# Patient Record
Sex: FEMALE | Race: BLACK OR AFRICAN AMERICAN | NOT HISPANIC OR LATINO | Employment: PART TIME | URBAN - METROPOLITAN AREA
[De-identification: names, ages, dates, MRNs, and addresses within clinical notes are randomized per-mention and may not be internally consistent; named-entity substitution may affect disease eponyms.]

---

## 2020-06-02 ENCOUNTER — OFFICE VISIT (OUTPATIENT)
Dept: FAMILY MEDICINE | Facility: CLINIC | Age: 30
End: 2020-06-02
Payer: COMMERCIAL

## 2020-06-02 VITALS
HEART RATE: 78 BPM | DIASTOLIC BLOOD PRESSURE: 87 MMHG | SYSTOLIC BLOOD PRESSURE: 127 MMHG | WEIGHT: 230.8 LBS | TEMPERATURE: 98 F | BODY MASS INDEX: 36.22 KG/M2 | OXYGEN SATURATION: 96 % | HEIGHT: 67 IN | RESPIRATION RATE: 18 BRPM

## 2020-06-02 DIAGNOSIS — Z11.3 SCREEN FOR STD (SEXUALLY TRANSMITTED DISEASE): Primary | ICD-10-CM

## 2020-06-02 DIAGNOSIS — R30.0 DYSURIA: ICD-10-CM

## 2020-06-02 LAB
BACTERIA: NORMAL
BILIRUBIN UR: NEGATIVE MG/DL
BLOOD UR: ABNORMAL MG/DL
CASTS: NORMAL /LPF
CLARITY, URINE: CLEAR
COLOR UR: YELLOW
CRYSTAL URINE: NORMAL /LPF
EPITHELIAL CELLS UR: NORMAL /LPF (ref 0–2)
GLUCOSE URINE: NEGATIVE
HIV 1+2 AB+HIV1 P24 AG SERPL QL IA: NEGATIVE
KETONES UR QL: NEGATIVE MG/DL
LEUKOCYTE ESTERASE UR: ABNORMAL
MUCOUS URINE: NORMAL LPF
NITRITE UR QL STRIP: NEGATIVE MG/DL
PH UR STRIP: 6.5 [PH] (ref 4.5–8)
PROTEIN UR: ABNORMAL MG/DL
RBC URINE: <2 /HPF
SP GR UR STRIP: 1.02 (ref 1–1.03)
UROBILINOGEN UR STRIP-ACNC: ABNORMAL E.U./DL
WBC URINE: NORMAL /HPF

## 2020-06-02 RX ORDER — CEPHALEXIN 500 MG/1
500 CAPSULE ORAL 2 TIMES DAILY
Qty: 10 CAPSULE | Refills: 0 | Status: SHIPPED | OUTPATIENT
Start: 2020-06-02 | End: 2020-06-07

## 2020-06-02 RX ORDER — IBUPROFEN 200 MG
400 TABLET ORAL
COMMUNITY
End: 2022-08-24

## 2020-06-02 ASSESSMENT — MIFFLIN-ST. JEOR: SCORE: 1802.14

## 2020-06-02 NOTE — LETTER
July 15, 2020      Ochoa Reji Fran  [address]   [place] [id number]        Dear ,    We are writing to inform you of your test results.    I have personally reviewed the results of your urinalysis, chlamydia/gonorrhea, syphilis, and HIV testing. They are within normal limits. No further testing is needed at this time.     Resulted Orders   Syphilis Screen [place] ([facility])   Result Value Ref Range    Treponema Antibody (Syphilis) Negative Negative    Narrative    Test performed by:  [facility] [address] [place] [address]   HIV Ag/Ab Screen [place] ([facility])   Result Value Ref Range    HIV Antigen/Antibody Negative Negative    Narrative    Test performed by:  [facility] [address] [place] [address]  Method is Abbott HIV Ag/Ab for the detection of HIV p24 antigen, HIV-1   antibodies and HIV-2 antibodies.   Chlamydia/Gono Amplified ([facility])   Result Value Ref Range    Chlamydia trac,Amplified Prb Negative Negative    N gonorrhoeae,Amplified Prb Negative Negative    Narrative    Test performed by:  [facility] [address] [place] [address]   Urinalysis, Micro If ([facility])   Result Value Ref Range    Specific Gravity Urine 1.020 1.005 - 1.030    pH Urine 6.5 4.5 - 8.0    Leukocyte Esterase UR 2+ (A) NEGATIVE    Nitrite Urine Negative NEGATIVE mg/dL    Protein UR Trace (A) NEGATIVE mg/dL    Glucose Urine Negative NEGATIVE    Ketones Urine Negative NEGATIVE mg/dL    Urobilinogen mg/dL 0.2 E/U./dL 0.2 E.U./dL E.U./dL    Bilirubin UR Negative NEGATIVE mg/dL    Blood UR 1+ (A) NEGATIVE mg/dL    Color Urine Yellow     Clarity, urine Clear    Urine Microscopic ([facility])   Result Value Ref Range    WBC Urine 25-50 <5 /hpf    RBC Urine <2 <5 /hpf    Epithelial Cells UR 25-50 0 - 2 /lpf    Mucous Urine None NONE lpf    Casts Urine WBC Casts NONE /lpf    Crystal Urine None NONE /lpf    Bacteria Wet Prep Many None       If you have any  questions or concerns, please call the clinic at the number listed above.       Sincerely,        Lisette Sanford MD

## 2020-06-02 NOTE — PROGRESS NOTES
Preceptor Attestation:   Patient seen, evaluated and discussed with the resident. I have verified the content of the note, which accurately reflects my assessment of the patient and the plan of care.  Supervising Physician:Lizeth Amador MD  [facility]

## 2020-06-02 NOTE — PROGRESS NOTES
HPI:       Ochoa Peters is a 29 year old  female with no significant PMH who presents for STI check and burning with urination.    Patient notes that she has had burning with urination and urinary frequency/urge for 2-3 days. Denies suprapubic pain, hematuria, vaginal discharge, vaginal itching, or dyspareunia. Bowel movements have been normal. Patient is prone to UTIs and this feels much like that. Last unprotected sexual activity was this past weekend, so approximately 3-4 days ago. Patient has the same partner.    No  was used for this visit.          PMHX:     Past medical, surgical, social, family history reviewed with patient and updated appropriately. Medications and allergies reviewed and updated as well.    There is no problem list on file for this patient.      Current Outpatient Medications   Medication Sig Dispense Refill     cephALEXin (KEFLEX) 500 MG capsule Take 1 capsule (500 mg) by mouth 2 times daily for 5 days 10 capsule 0       Social History     Socioeconomic History     Marital status: Single     Spouse name: Not on file     Number of children: Not on file     Years of education: Not on file     Highest education level: Not on file   Occupational History     Not on file   Social Needs     Financial resource strain: Not on file     Food insecurity     Worry: Not on file     Inability: Not on file     Transportation needs     Medical: Not on file     Non-medical: Not on file   Tobacco Use     Smoking status: Never Smoker     Smokeless tobacco: Never Used   Substance and Sexual Activity     Alcohol use: Yes     Comment: Rare     Drug use: Never     Sexual activity: Yes     Partners: Male   Lifestyle     Physical activity     Days per week: Not on file     Minutes per session: Not on file     Stress: Not on file   Relationships     Social connections     Talks on phone: Not on file     Gets together: Not on file     Attends Episcopalian service: Not on file     Active  "member of club or organization: Not on file     Attends meetings of clubs or organizations: Not on file     Relationship status: Not on file     Intimate partner violence     Fear of current or ex partner: Not on file     Emotionally abused: Not on file     Physically abused: Not on file     Forced sexual activity: Not on file   Other Topics Concern     Not on file   Social History Narrative     Not on file        No Known Allergies    No results found for this or any previous visit (from the past 24 hour(s)).         Review of Systems:   12 point review of systems negative unless stated in HPI.          Physical Exam:     Vitals:    06/02/20 1135   BP: 127/87   Pulse: 78   Resp: 18   Temp: 98  F (36.7  C)   TempSrc: Oral   SpO2: 96%   Weight: 104.7 kg (230 lb 12.8 oz)   Height: 1.69 m (5' 6.54\")     Body mass index is 36.66 kg/m .    GENERAL APPEARANCE: healthy, alert and no distress.  EYES: Eyes grossly normal to inspection,  PERRL  RESP: lungs clear to auscultation - no rales, rhonchi or wheezes  CV: regular rate and rhythm,  and no murmur, click,  rub or gallop  ABDOMEN: soft, nontender, without hepatosplenomegaly or masses, no suprapubic or CVA tenderness  MS: extremities normal- no gross deformities noted  SKIN: no suspicious lesions or rashes  NEURO: Normal strength and tone, sensory exam grossly normal, mentation appears intact and speech normal  PSYCH: mood and affect normal/bright    Assessment and Plan     Dariana Peters is a 29 year-old female with no significant PMH presenting to [facility] with the following concerns: STI check and dysuria.    Dysuria, STI check: Patient has had 2-3 days of dysuria and urinary frequency. No additional symptoms and no vaginal symptoms or discharge. Patient states that she has history of multiple UTIs and this feels much like her previous UTIs. She is sexually active and does have unprotected sex, but it is with a stable partner and her concern for STIs is " low. However, she is open to having STI testing just in case.  -Urinalysis + UC  -GC/chlamydia  -HIV  -Syphilis  -Will prescribe keflex 500 mg BID for 5 days for suspected UTI and narrow based on culture results    Options for treatment and follow-up care were reviewed with the patient and/or guardian. Ochoa Peters and/or guardian engaged in the decision making process and verbalized understanding of the options discussed and agreed with the final plan.    Lisette Sanford MD      Precepted today with: Lizeth Amador MD

## 2020-06-03 LAB — TREPONEMA ANTIBODY (SYPHILIS): NEGATIVE

## 2020-06-05 LAB
C TRACH RRNA SPEC QL NAA+PROBE: NEGATIVE
N GONORRHOEA RRNA SPEC QL NAA+PROBE: NEGATIVE

## 2020-06-10 ENCOUNTER — TELEPHONE (OUTPATIENT)
Dept: FAMILY MEDICINE | Facility: CLINIC | Age: 30
End: 2020-06-10

## 2020-06-10 NOTE — TELEPHONE ENCOUNTER
[facility] phone call message- general phone call:    Reason for call: Patient called stating she was seen 6/2/2020 and is wanting to know if her results are back yet as she has not heard back. Please call and advise.    Return call needed: Yes    OK to leave a message on voice mail? Yes    Primary language: English      needed? No    Call taken on Jenny 10, 2020 at 11:26 AM by Shayna Cruz

## 2020-06-10 NOTE — TELEPHONE ENCOUNTER
STI results were negative. Ochoa still has not picked up Keflex that was prescribed on 6/7/2020. Still experiencing symptoms, will plan to  med today to start. Michelle HERNANDEZ

## 2020-07-15 NOTE — RESULT ENCOUNTER NOTE
Please mail the results to the patient:     Dear Ms Peters,    I have personally reviewed the results of your urinalysis, chlamydia/gonorrhea, syphilis, and HIV testing. They are within normal limits. No further testing is needed at this time.    Sincerely,    Lisette Sanford MD

## 2020-08-28 ENCOUNTER — OFFICE VISIT (OUTPATIENT)
Dept: FAMILY MEDICINE | Facility: CLINIC | Age: 30
End: 2020-08-28
Payer: COMMERCIAL

## 2020-08-28 VITALS
WEIGHT: 236 LBS | OXYGEN SATURATION: 99 % | HEIGHT: 65 IN | HEART RATE: 78 BPM | BODY MASS INDEX: 39.32 KG/M2 | DIASTOLIC BLOOD PRESSURE: 82 MMHG | SYSTOLIC BLOOD PRESSURE: 129 MMHG | TEMPERATURE: 97.9 F | RESPIRATION RATE: 16 BRPM

## 2020-08-28 DIAGNOSIS — B96.89 BACTERIAL VAGINOSIS: Primary | ICD-10-CM

## 2020-08-28 DIAGNOSIS — N76.0 BACTERIAL VAGINOSIS: Primary | ICD-10-CM

## 2020-08-28 DIAGNOSIS — Z11.3 SCREEN FOR STD (SEXUALLY TRANSMITTED DISEASE): Primary | ICD-10-CM

## 2020-08-28 DIAGNOSIS — R30.0 DYSURIA: ICD-10-CM

## 2020-08-28 LAB
BACTERIA: NORMAL
CLUE CELLS: NORMAL
MOTILE TRICHOMONAS: NEGATIVE
ODOR: NORMAL
PH WET PREP: >4.5 (ref 3.8–4.5)
WBC WET PREP: NORMAL (ref 2–5)
YEAST: NORMAL

## 2020-08-28 RX ORDER — METRONIDAZOLE 500 MG/1
500 TABLET ORAL 2 TIMES DAILY
Qty: 14 TABLET | Refills: 0 | Status: SHIPPED | OUTPATIENT
Start: 2020-08-28 | End: 2020-09-04

## 2020-08-28 ASSESSMENT — MIFFLIN-ST. JEOR: SCORE: 1806.98

## 2020-08-28 NOTE — LETTER
September 2, 2020      Ochoa Peters  [facility]   [place] [address]        Dear Ms.Fran,    We are writing to inform you of your test results.    I have personally reviewed the results of your chlamydia and gonorrhea. As I suspected, they are both negative. As we discussed earlier, your symptoms are likely related to bacterial vaginosis for which I have already ordered a prescription for flagyl for 7 days.  No further testing is needed at this time.    Resulted Orders   Chlamydia/Gono Amplified ([facility])   Result Value Ref Range    Chlamydia trac,Amplified Prb Negative Negative    N gonorrhoeae,Amplified Prb Negative Negative    Narrative    Test performed by:  [facility] [address] [place] [address]   Wet Prep ([facility])   Result Value Ref Range    Yeast Wet Prep None none    Motile Trichomonas Wet Prep Negative Negative    Clue Cells Wet Prep Present >20% NONE    WBC WET PREP 2-5 2 - 5    Bacteria Wet Prep Many None    pH Wet Prep >4.5 3.8 - 4.5    Odor Wet Prep None NONE       If you have any questions or concerns, please call the clinic at the number listed above.       Sincerely,        Lisette Sanford MD

## 2020-08-28 NOTE — PROGRESS NOTES
HPI:       Ochoa Peters is a 29 year old  female with PMH of recurrent BV infections who presents for suspected recurrent BV infection.    Patient states that she has had issues with recurrent BV infections. States that she is having white vaginal discharge with a fishy odor that is exactly like her other BV infections. Denies abnormal bleeding, itching, dysuria, hematuria, urinary frequency. Denies fevers or chills. Would like testing for BV today and is okay with chlamydia gonorrhea screening as well.            PMHX:     There is no problem list on file for this patient.      Current Outpatient Medications   Medication Sig Dispense Refill     ibuprofen (ADVIL/MOTRIN) 200 MG tablet Take 400 mg by mouth       omeprazole (PRILOSEC) 20 MG DR capsule Take 20 mg by mouth         Social History     Socioeconomic History     Marital status: Single     Spouse name: Not on file     Number of children: Not on file     Years of education: Not on file     Highest education level: Not on file   Occupational History     Not on file   Social Needs     Financial resource strain: Not on file     Food insecurity     Worry: Not on file     Inability: Not on file     Transportation needs     Medical: Not on file     Non-medical: Not on file   Tobacco Use     Smoking status: Never Smoker     Smokeless tobacco: Never Used   Substance and Sexual Activity     Alcohol use: Yes     Comment: Rare     Drug use: Never     Sexual activity: Yes     Partners: Male   Lifestyle     Physical activity     Days per week: Not on file     Minutes per session: Not on file     Stress: Not on file   Relationships     Social connections     Talks on phone: Not on file     Gets together: Not on file     Attends Orthodox service: Not on file     Active member of club or organization: Not on file     Attends meetings of clubs or organizations: Not on file     Relationship status: Not on file     Intimate partner violence     Fear of current  or ex partner: Not on file     Emotionally abused: Not on file     Physically abused: Not on file     Forced sexual activity: Not on file   Other Topics Concern     Not on file   Social History Narrative     Not on file        No Known Allergies    No results found for this or any previous visit (from the past 24 hour(s)).         Review of Systems:   12 point review of systems negative unless stated in HPI.           Physical Exam:     There were no vitals filed for this visit.  There is no height or weight on file to calculate BMI.    GENERAL APPEARANCE: healthy, alert and no distress.  RESP: breathing unlabored on room air  : External vagina appears normal. No cervical tenderness. Copious white discharge with faint odor present over cervix and vaginal vault. No bleeding visible.   SKIN: no suspicious lesions or rashes  NEURO: Normal strength and tone, sensory exam grossly normal, mentation appears intact and speech normal  PSYCH: mood and affect normal/bright      Assessment and Plan     Dariana Peters is a 29 year-old female with PMH of recurrent BV infections who presents with the following concern: vaginal discharge.    Vaginal discharge: Patient has recurrent BV infections. She has been having several days of white malodorous discharge. No additional local or systemic symptoms, and she feels this is exactly like her other BV infections.  - Wet prep  - Gonorrhea/chlamydia  - Will call flagyl into pharmacy pending results of wet prep    Options for treatment and follow-up care were reviewed with the patient and/or guardian. Ochoa Peters and/or guardian engaged in the decision making process and verbalized understanding of the options discussed and agreed with the final plan.    Lisette Sanford MD      Precepted today with: Mitzy Julian MD

## 2020-08-28 NOTE — RESULT ENCOUNTER NOTE
Patient's wet prep results suggest that she has symptomatic bacterial vaginosis. Called in prescription for flagyl 500 mg BID for 7 days, and called patient to leave a message letting her know that this prescription is available for her whenever she should want to pick it up.    Lisette Sanford MD

## 2020-08-31 NOTE — PROGRESS NOTES
Preceptor Attestation:  Patient seen, examined, and discussed with the resident..  I agree with written assessment and plan of care.  Supervising Physician:  Magali Julian MD  [facility]

## 2020-09-01 LAB
C TRACH RRNA CVX QL NAA+PROBE: NEGATIVE
N GONORRHOEA RRNA SPEC QL NAA+PROBE: NEGATIVE

## 2020-09-02 NOTE — RESULT ENCOUNTER NOTE
Please mail the results to the patient:    Dear Ms Peters,    I have personally reviewed the results of your chlamydia and gonorrhea. As I suspected, they are both negative. As we discussed earlier, your symptoms are likely related to bacterial vaginosis for which I have already ordered a prescription for flagyl for 7 days.  No further testing is needed at this time.    Sincerely,    Lisette Sanford MD

## 2020-11-04 ENCOUNTER — OFFICE VISIT (OUTPATIENT)
Dept: FAMILY MEDICINE | Facility: CLINIC | Age: 30
End: 2020-11-04
Payer: COMMERCIAL

## 2020-11-04 VITALS
RESPIRATION RATE: 20 BRPM | OXYGEN SATURATION: 98 % | SYSTOLIC BLOOD PRESSURE: 146 MMHG | DIASTOLIC BLOOD PRESSURE: 88 MMHG | TEMPERATURE: 97 F | BODY MASS INDEX: 40.33 KG/M2 | WEIGHT: 245 LBS | HEART RATE: 74 BPM

## 2020-11-04 DIAGNOSIS — B96.89 BACTERIAL VAGINOSIS: ICD-10-CM

## 2020-11-04 DIAGNOSIS — Z11.3 SCREEN FOR STD (SEXUALLY TRANSMITTED DISEASE): Primary | ICD-10-CM

## 2020-11-04 DIAGNOSIS — N76.0 BACTERIAL VAGINOSIS: ICD-10-CM

## 2020-11-04 LAB — HIV 1+2 AB+HIV1 P24 AG SERPL QL IA: NEGATIVE

## 2020-11-04 PROCEDURE — 99213 OFFICE O/P EST LOW 20 MIN: CPT | Mod: GC | Performed by: STUDENT IN AN ORGANIZED HEALTH CARE EDUCATION/TRAINING PROGRAM

## 2020-11-04 RX ORDER — METRONIDAZOLE 500 MG/1
500 TABLET ORAL 2 TIMES DAILY
Qty: 14 TABLET | Refills: 0 | Status: SHIPPED | OUTPATIENT
Start: 2020-11-04 | End: 2020-11-11

## 2020-11-04 NOTE — PROGRESS NOTES
Preceptor Attestation:  Patient's case reviewed and discussed with Jose J Gibson MD resident and I evaluated the patient. I agree with written assessment and plan of care.  Supervising Physician:  DENNISE SOLANO MD  [facility]

## 2020-11-04 NOTE — PROGRESS NOTES
Assessment and Plan   (Z11.3) Screen for STD (sexually transmitted disease)  (primary encounter diagnosis)  Comment: Patient has symptoms consistent with prior episodes of BV so we will prescribe metronidazole empirically.  Will test for GC/chlamydia, HIV, syphilis per request.  She is low risk for these.  Plan: Chlamydia/Gono Amplified ([facility]), Syphilis        Screen [place] (RPR/VDRL) ([facility]), HIV         Ag/Ab Screen [place] ([facility]), CANCELED:         [facility]ono [facility]          (N76.0,  B96.89) Bacterial vaginosis  Comment: As above.  Plan: metroNIDAZOLE (FLAGYL) 500 MG tablet          Follow up as needed.    Options for treatment and follow-up care were reviewed with the patient and/or guardian. Ochoa Peters and/or guardian engaged in the decision making process and verbalized understanding of the options discussed and agreed with the final plan.    Arben Gibson MD  [facility], PGY-2    Precepted patient with Dr. Corinne Baum       HPI:   Ochoa Peters is a 29 year old female who presents to clinic today for   Chief Complaint   Patient presents with     STD     Patient is here for routine STI check.  She has been having wet discharge, clear, no pain, itchiness, has some fishy odor, no other symptoms.  Consistent with multiple prior episodes of BV.  Routine STI check otherwise.  One partner in the last 6 months, as far as she knows he is clean.  Does not use condoms.  Not on birth control.  Not looking to have a baby but would welcome one if it came.           Review of Systems:     10 point ROS negative except as noted in HPI.         PMHX:   Active Problems List  There is no problem list on file for this patient.    Active problem list reviewed and updated.    Current Medications  Current Outpatient Medications   Medication Sig Dispense Refill     metroNIDAZOLE (FLAGYL)  500 MG tablet Take 1 tablet (500 mg) by mouth 2 times daily for 7 days 14 tablet 0     ibuprofen (ADVIL/MOTRIN) 200 MG tablet Take 400 mg by mouth       omeprazole (PRILOSEC) 20 MG DR capsule Take 20 mg by mouth       Medication list reviewed and updated.    Social History  Social History     Tobacco Use     Smoking status: Never Smoker     Smokeless tobacco: Never Used   Substance Use Topics     Alcohol use: Yes     Comment: Rare     Drug use: Never     History   Drug Use Unknown       Family History  Family History   Problem Relation Age of Onset     Hypertension Mother      Diabetes No family hx of      Cancer No family hx of        Allergies  No Known Allergies         Physical Exam:     Vitals:    11/04/20 1607   BP: (!) 146/88   Pulse: 74   Resp: 20   Temp: 97  F (36.1  C)   TempSrc: Oral   SpO2: 98%   Weight: 111.1 kg (245 lb)     Body mass index is 40.33 kg/m .    GENERAL APPEARANCE: alert, appears stated age, no acute distress  HEENT: Eyes grossly normal to inspection, nares normal, and mouth and throat without erythema, ulcers, or lesions  RESP: lungs clear to auscultation - no rales, rhonchi, or wheezes  CV: regular rate and rhythm, no murmurs  ABDOMEN: soft, nontender   MSK: extremities normal, no gross deformities noted, no lower extremity edema  SKIN: no suspicious lesions or rashes   NEURO: Normal strength and tone, sensory exam grossly normal, mentation appears intact and speech normal  PSYCH: mood and affect appropriate

## 2020-11-05 LAB — TREPONEMA ANTIBODY (SYPHILIS): NEGATIVE

## 2020-11-06 LAB
C TRACH RRNA SPEC QL NAA+PROBE: NEGATIVE
N GONORRHOEA RRNA SPEC QL NAA+PROBE: NEGATIVE

## 2021-01-20 NOTE — PROGRESS NOTES
HPI:       Ochoa Peters is a 30 year old  female who presents for the new concern(s) of    STI check:  -Patient gets BV every ~3-4 months  -Has been treated multiple times within the course of the last year  -Has had symptoms for the last ~6 days, consistent with vaginal discharge and odor  -Discharge is white in color  -She has no other STI concerns, but this feels like BV she has had in the past           PMHX:     There is no problem list on file for this patient.      Current Outpatient Medications   Medication Sig Dispense Refill     ibuprofen (ADVIL/MOTRIN) 200 MG tablet Take 400 mg by mouth       omeprazole (PRILOSEC) 20 MG DR capsule Take 20 mg by mouth         Social History     Socioeconomic History     Marital status: Single     Spouse name: Not on file     Number of children: Not on file     Years of education: Not on file     Highest education level: Not on file   Occupational History     Not on file   Social Needs     Financial resource strain: Not on file     Food insecurity     Worry: Not on file     Inability: Not on file     Transportation needs     Medical: Not on file     Non-medical: Not on file   Tobacco Use     Smoking status: Never Smoker     Smokeless tobacco: Never Used   Substance and Sexual Activity     Alcohol use: Yes     Comment: Rare     Drug use: Never     Sexual activity: Yes     Partners: Male   Lifestyle     Physical activity     Days per week: Not on file     Minutes per session: Not on file     Stress: Not on file   Relationships     Social connections     Talks on phone: Not on file     Gets together: Not on file     Attends Anabaptism service: Not on file     Active member of club or organization: Not on file     Attends meetings of clubs or organizations: Not on file     Relationship status: Not on file     Intimate partner violence     Fear of current or ex partner: Not on file     Emotionally abused: Not on file     Physically abused: Not on file      "Forced sexual activity: Not on file   Other Topics Concern     Not on file   Social History Narrative     Not on file        No Known Allergies    Results for orders placed or performed in visit on 01/21/21 (from the past 24 hour(s))   Wet Prep ([facility])   Result Value Ref Range    Yeast Wet Prep None none    Motile Trichomonas Wet Prep Negative Negative    Clue Cells Wet Prep Present <20% NONE    WBC WET PREP 5-10 2 - 5    Bacteria Wet Prep Moderate None    pH Wet Prep 5.6     Odor Wet Prep None NONE            Review of Systems:     A 10 point review of systems including constitutional, cardiovascular, respiratory, HEENT, GI, , neurological, MSK, skin, endocrine, is negative unless stated in HPI          Physical Exam:     Vitals:    01/21/21 1133   BP: 127/85   Pulse: 69   Temp: 98  F (36.7  C)   TempSrc: Oral   SpO2: 98%   Weight: 108.4 kg (239 lb)   Height: 1.705 m (5' 7.13\")     Body mass index is 37.29 kg/m .    GENERAL APPEARANCE: healthy, alert and no distress,  GU_female: normal cervix, adnexae, and uterus without masses. There is small amount of white discharge.   MS: extremities normal- no gross deformities noted  SKIN: no suspicious lesions or rashes  NEURO: Normal strength and tone, sensory exam grossly normal, mentation appears intact and speech normal  PSYCH: mood and affect normal/bright      Assessment and Plan     1. Vaginal odor  2. Vaginal discharge  Wet prep negative today, but pH elevated. Given her recurrent symptoms, we discussed that she may benefit from intravaginal boric acid. We discussed how to use this product. She will recheck if symptoms persist after boric acid use.  - Wet Prep ([facility])    Options for treatment and follow-up care were reviewed with the patient and/or guardian. Ochoa Peters and/or guardian engaged in the decision making process and verbalized understanding of the options discussed and agreed with the final plan.    Humberto Siegel MD  [facility] Resident, PGY3      Precepted today with: Giulia Chen MD

## 2021-01-21 ENCOUNTER — OFFICE VISIT (OUTPATIENT)
Dept: FAMILY MEDICINE | Facility: CLINIC | Age: 31
End: 2021-01-21
Payer: COMMERCIAL

## 2021-01-21 VITALS
WEIGHT: 239 LBS | SYSTOLIC BLOOD PRESSURE: 127 MMHG | OXYGEN SATURATION: 98 % | BODY MASS INDEX: 37.51 KG/M2 | DIASTOLIC BLOOD PRESSURE: 85 MMHG | HEIGHT: 67 IN | HEART RATE: 69 BPM | TEMPERATURE: 98 F

## 2021-01-21 DIAGNOSIS — N89.8 VAGINAL DISCHARGE: ICD-10-CM

## 2021-01-21 DIAGNOSIS — N89.8 VAGINAL ODOR: Primary | ICD-10-CM

## 2021-01-21 LAB
BACTERIA: NORMAL
CLUE CELLS: NORMAL
MOTILE TRICHOMONAS: NEGATIVE
ODOR: NORMAL
PH WET PREP: 5.6
WBC WET PREP: NORMAL (ref 2–5)
YEAST: NORMAL

## 2021-01-21 PROCEDURE — 87210 SMEAR WET MOUNT SALINE/INK: CPT | Performed by: STUDENT IN AN ORGANIZED HEALTH CARE EDUCATION/TRAINING PROGRAM

## 2021-01-21 PROCEDURE — 99213 OFFICE O/P EST LOW 20 MIN: CPT | Mod: GC | Performed by: STUDENT IN AN ORGANIZED HEALTH CARE EDUCATION/TRAINING PROGRAM

## 2021-01-21 ASSESSMENT — MIFFLIN-ST. JEOR: SCORE: 1843.73

## 2021-01-21 NOTE — PROGRESS NOTES
Preceptor Attestation:   Patient seen, evaluated and discussed with the resident. I have verified the content of the note, which accurately reflects my assessment of the patient and the plan of care.  Supervising Physician:Giulia Chen MD  [facility]

## 2021-03-31 ENCOUNTER — OFFICE VISIT (OUTPATIENT)
Dept: FAMILY MEDICINE | Facility: CLINIC | Age: 31
End: 2021-03-31
Payer: COMMERCIAL

## 2021-03-31 VITALS
DIASTOLIC BLOOD PRESSURE: 94 MMHG | RESPIRATION RATE: 18 BRPM | OXYGEN SATURATION: 99 % | SYSTOLIC BLOOD PRESSURE: 138 MMHG | HEIGHT: 67 IN | BODY MASS INDEX: 38.62 KG/M2 | HEART RATE: 83 BPM | WEIGHT: 246.08 LBS

## 2021-03-31 DIAGNOSIS — N89.8 VAGINAL DISCHARGE: Primary | ICD-10-CM

## 2021-03-31 DIAGNOSIS — Z13.9 SCREENING FOR CONDITION: ICD-10-CM

## 2021-03-31 LAB
BACTERIA: NORMAL
CLUE CELLS: NORMAL
MOTILE TRICHOMONAS: NEGATIVE
ODOR: NORMAL
PH WET PREP: NORMAL (ref 3.8–4.5)
WBC WET PREP: NORMAL (ref 2–5)
YEAST: NORMAL

## 2021-03-31 PROCEDURE — 99213 OFFICE O/P EST LOW 20 MIN: CPT | Mod: GC | Performed by: STUDENT IN AN ORGANIZED HEALTH CARE EDUCATION/TRAINING PROGRAM

## 2021-03-31 PROCEDURE — 87210 SMEAR WET MOUNT SALINE/INK: CPT | Performed by: STUDENT IN AN ORGANIZED HEALTH CARE EDUCATION/TRAINING PROGRAM

## 2021-03-31 ASSESSMENT — MIFFLIN-ST. JEOR: SCORE: 1869.58

## 2021-03-31 NOTE — LETTER
April 5, 2021      Ochoa Peters  [facility] [place] [id number]        Dear MsDiana,    We are writing to inform you of your test results.    Your lab results are negative for gonorrhea, chlamydia, yeast, and bacterial vaginosis. If you are still having symptoms, please come back to clinic to discuss these symptoms     Resulted Orders   Chlamydia/Gono Amplified ([facility])   Result Value Ref Range    Chlamydia trac,Amplified Prb Negative Negative    N gonorrhoeae,Amplified Prb Negative Negative    Narrative    Test performed by:  [facility] [address] [place] [address]   Wet Prep ([facility])   Result Value Ref Range    Yeast Wet Prep None none    Motile Trichomonas Wet Prep Negative Negative    Clue Cells Wet Prep Present <20% NONE    WBC WET PREP 2-5 2 - 5    Bacteria Wet Prep Few None    pH Wet Prep Not performed 3.8 - 4.5    Odor Wet Prep None NONE       If you have any questions or concerns, please call the clinic at the number listed above.       Sincerely,      Laron Armendariz MD

## 2021-03-31 NOTE — PROGRESS NOTES
"       HPI:       Ochoa Peters is a 30 year old  female  who presents for:    Vaginal discharge  - says she is prone to BV   - feels like BV with the increased discharge   - sexually active with one male partner   - would be ok to get pregnant, not currently using contraception   - LMP March 10, 2021  - would also like gonorrhea and chlamydia testing although she is not concerned about an exposure  - declines HIV, syphilis, and hepatitis screening   - also due for pap and is ok to get one today             PMHX:     There is no problem list on file for this patient.      Current Outpatient Medications   Medication Sig Dispense Refill     ibuprofen (ADVIL/MOTRIN) 200 MG tablet Take 400 mg by mouth       omeprazole (PRILOSEC) 20 MG DR capsule Take 20 mg by mouth          No Known Allergies    No results found for this or any previous visit (from the past 24 hour(s)).         Review of Systems:   10 point ROS negative except noted in above in HPI         Physical Exam:     Vitals:    03/31/21 1621   BP: (!) 138/94   Pulse: 83   Resp: 18   SpO2: 99%   Weight: 111.6 kg (246 lb 1.3 oz)   Height: 1.695 m (5' 6.73\")     Body mass index is 38.85 kg/m .    GENERAL APPEARANCE: healthy, alert and no distress,  : EGBUS normal, vagina with normal discharge, normal cervix, adnexae,and uterus without masses or discharge  PSYCH: mood and affect normal/bright      Assessment and Plan     1. Vaginal discharge  Feels like BV to her. No known exposure to STI but wants gonorrhea and chlamydia. Normal exam  - Chlamydia/Gono Amplified ([employer])  - Wet Prep ([facility])  - called to update patient on results: no yeast or BV. Gonorrhea and chlamydia not resulted yet    2. Screening for condition  Due for pap.   - GYN Cytology ([facility])      PRN for new or worsening symptoms    Options for treatment and follow-up care were reviewed with the patient and/or guardian. Ochoa Peters and/or guardian engaged in the " decision making process and verbalized understanding of the options discussed and agreed with the final plan.      Nic Amador MD   [facility] Resident   Pager: [phone]      Precepted today with: Laron Armendariz MD

## 2021-03-31 NOTE — LETTER
April 5, 2021      Ochoa Peters  [address]   [place] [address]        Dear MsDiana,    We are writing to inform you of your test results.    Pap normal, this means you do not need another pap for 3 years.     Resulted Orders   Chlamydia/Gono Amplified ([facility])   Result Value Ref Range    Chlamydia trac,Amplified Prb Negative Negative    N gonorrhoeae,Amplified Prb Negative Negative    Narrative    Test performed by:  [facility] [address] [place] [address]   Wet Prep ([facility])   Result Value Ref Range    Yeast Wet Prep None none    Motile Trichomonas Wet Prep Negative Negative    Clue Cells Wet Prep Present <20% NONE    WBC WET PREP 2-5 2 - 5    Bacteria Wet Prep Few None    pH Wet Prep Not performed 3.8 - 4.5    Odor Wet Prep None NONE   GYN Cytology ([facility])   Result Value Ref Range    Lab AP Case Report SEE RESULTS BELOW       Comment:      Gynecologic Cytology Report                       Case: [id number]                                     Authorizing Provider:  Laron Armendariz MD           Collected:             03/31/2021 3212              Ordering Location:     [facility]      Received:              04/01/2021 [address] [place] [address]                                                                                     Laboratory                                                                     First Screen:          Celia Washington CT                                                                             (ASCP)                                                                         Rescreen:              Shannon Royal CT                                                                              (ASCP)                                                                         Specimen:    SUREPATH PAP, SCREENING, Endocervical/cervical                                                 Lab AP Gyn Interpretation SEE RESULTS BELOW Negative for squamous intraepithelial le      Comment:      Negative for squamous intraepithelial lesion or malignancy  Electronically signed by Shannon Royal CT (ASCP) on 4/2/2021 at 11:41   AM      Lab AP Malignant? Normal Normal    Specimen adequacy:       Satisfactory for evaluation, endocervical/transformation zone component absent    HPV Reflex? Yes if Abnormal     High Risk? No     Last Mens Period 3/10/2021     Lab AP Abnormal Bleeding No     Lab AP Patient Status na     Lab AP Birth Control/Hormones None     Lab AP Previous Normal none     Lab AP Previous Abnl none     Lab AP Cervical Appearance normal     Narrative    Test performed by:  [facility]  [address] [place] [address]       If you have any questions or concerns, please call the clinic at the number listed above.       Sincerely,      Laron Armendariz MD

## 2021-04-01 LAB
C TRACH RRNA CVX QL NAA+PROBE: NEGATIVE
N GONORRHOEA RRNA SPEC QL NAA+PROBE: NEGATIVE

## 2021-04-01 NOTE — RESULT ENCOUNTER NOTE
Please send the following in a letter to the patient:   Hi Negrohia,   Your lab results are negative for gonorrhea, chlamydia, yeast, and bacterial vaginosis. If you are still having symptoms, please come back to clinic to discuss these symptoms   - Nic Amador MD

## 2021-04-01 NOTE — RESULT ENCOUNTER NOTE
I called patient and discussed results. Updated that negative for BV and negative for yeast. Still waiting for gonorrhea and chlamydia     Nic Amador MD

## 2021-04-02 ENCOUNTER — RECORDS - HEALTHEAST (OUTPATIENT)
Dept: ADMINISTRATIVE | Facility: OTHER | Age: 31
End: 2021-04-02

## 2021-04-02 LAB
CYTOLOGY CVX/VAG DOC THIN PREP: NORMAL
HIGH RISK?: NO
HPV REFLEX?: NORMAL
LAB AP ABNORMAL BLEEDING: NO
LAB AP BIRTH CONTROL/HORMONES: NORMAL
LAB AP CERVICAL APPEARANCE: NORMAL
LAB AP PATIENT STATUS: NORMAL
LAB AP PREVIOUS ABNL: NORMAL
LAB AP PREVIOUS NORMAL: NORMAL
LAST MENS PERIOD: NORMAL
PATH REPORT.COMMENTS IMP SPEC: NORMAL
PATH REPORT.COMMENTS IMP SPEC: NORMAL
SPECIMEN ADEQUACY:: NORMAL

## 2021-04-02 NOTE — PROGRESS NOTES
I have personally reviewed the history and examination as documented by Dr. Fran Amador.  I was present during key portions of the visit and agree with the assessment and plan as documented for 30 yr old female here for vaginal discharge. Wet prep negative. STI testing sent. Precautions given. Anticipatory guidance given.     Laron Armendariz MD  April 2, 2021  5:00 PM

## 2021-04-04 PROBLEM — Z01.419 ENCOUNTER FOR CERVICAL PAP SMEAR WITH PELVIC EXAM: Status: ACTIVE | Noted: 2021-04-04

## 2021-04-04 NOTE — RESULT ENCOUNTER NOTE
Please send the following in a letter to the patient:   Pap normal, this means you do not need another pap for 3 years.   -Nic Amador MD

## 2021-04-05 ENCOUNTER — TELEPHONE (OUTPATIENT)
Dept: FAMILY MEDICINE | Facility: CLINIC | Age: 31
End: 2021-04-05

## 2021-04-05 NOTE — TELEPHONE ENCOUNTER
[facility] phone call message- patient requesting results:    Test: Lab    Date of test: 03/31/2021    Additional Comments: Patient is wondering if her results are in, please call and advise.    OK to leave a message on voice mail? Yes    Primary language: English      needed? No    Call taken on April 5, 2021 at 2:48 PM by Scott Vaughn

## 2021-04-06 NOTE — TELEPHONE ENCOUNTER
Normal results from 03/31/2021 given to patient. Patient called stating she didn't get a call yet about her results that she called yesterday. Gave her negative results on her labs and regarding her pap.     April 6, 2021 at 11:10 AM by Tanner Cruz  [facility]  [phone]

## 2021-09-10 ENCOUNTER — OFFICE VISIT (OUTPATIENT)
Dept: FAMILY MEDICINE | Facility: CLINIC | Age: 31
End: 2021-09-10
Payer: COMMERCIAL

## 2021-09-10 VITALS
WEIGHT: 239 LBS | OXYGEN SATURATION: 98 % | HEART RATE: 75 BPM | RESPIRATION RATE: 18 BRPM | DIASTOLIC BLOOD PRESSURE: 84 MMHG | BODY MASS INDEX: 37.73 KG/M2 | SYSTOLIC BLOOD PRESSURE: 124 MMHG

## 2021-09-10 DIAGNOSIS — N89.8 VAGINAL DISCHARGE: ICD-10-CM

## 2021-09-10 DIAGNOSIS — B96.89 BACTERIAL VAGINOSIS: ICD-10-CM

## 2021-09-10 DIAGNOSIS — A59.01 TRICHOMONAL VAGINITIS: ICD-10-CM

## 2021-09-10 DIAGNOSIS — N76.0 BACTERIAL VAGINOSIS: ICD-10-CM

## 2021-09-10 DIAGNOSIS — Z11.3 ROUTINE SCREENING FOR STI (SEXUALLY TRANSMITTED INFECTION): Primary | ICD-10-CM

## 2021-09-10 DIAGNOSIS — B37.31 CANDIDIASIS OF VAGINA: ICD-10-CM

## 2021-09-10 LAB
CLUE CELLS: PRESENT
HCG UR QL: NEGATIVE
TRICHOMONAS, WET PREP: PRESENT
WBC'S/HIGH POWER FIELD, WET PREP: ABNORMAL
YEAST, WET PREP: PRESENT

## 2021-09-10 PROCEDURE — 87491 CHLMYD TRACH DNA AMP PROBE: CPT | Performed by: FAMILY MEDICINE

## 2021-09-10 PROCEDURE — 87591 N.GONORRHOEAE DNA AMP PROB: CPT | Performed by: FAMILY MEDICINE

## 2021-09-10 PROCEDURE — 87210 SMEAR WET MOUNT SALINE/INK: CPT | Performed by: FAMILY MEDICINE

## 2021-09-10 PROCEDURE — 81025 URINE PREGNANCY TEST: CPT | Performed by: FAMILY MEDICINE

## 2021-09-10 PROCEDURE — 99214 OFFICE O/P EST MOD 30 MIN: CPT | Performed by: FAMILY MEDICINE

## 2021-09-10 RX ORDER — FLUCONAZOLE 150 MG/1
150 TABLET ORAL ONCE
Qty: 1 TABLET | Refills: 0 | Status: SHIPPED | OUTPATIENT
Start: 2021-09-10 | End: 2021-09-10

## 2021-09-10 RX ORDER — METRONIDAZOLE 500 MG/1
500 TABLET ORAL 2 TIMES DAILY
Qty: 14 TABLET | Refills: 0 | Status: SHIPPED | OUTPATIENT
Start: 2021-09-10 | End: 2021-09-17

## 2021-09-10 NOTE — PROGRESS NOTES
"    Assessment & Plan     1. Routine screening for STI (sexually transmitted infection)  - Neisseria gonorrhoeae PCR; Future  - Chlamydia trachomatis PCR; Future  - Neisseria gonorrhoeae PCR  - Chlamydia trachomatis PCR  - HCG qualitative urine; Future  - HCG qualitative urine    2. Vaginal discharge  - Wet preparation    3. Candidiasis of vagina  - fluconazole (DIFLUCAN) 150 MG tablet; Take 1 tablet (150 mg) by mouth once for 1 dose  Dispense: 1 tablet; Refill: 0    4. Trichomonal vaginitis  Chose to use the alternate dosing consistent with BV treatment too instead of the single dose trich treatment  - metroNIDAZOLE (FLAGYL) 500 MG tablet; Take 1 tablet (500 mg) by mouth 2 times daily for 7 days  Dispense: 14 tablet; Refill: 0    5. Bacterial vaginosis  - metroNIDAZOLE (FLAGYL) 500 MG tablet; Take 1 tablet (500 mg) by mouth 2 times daily for 7 days  Dispense: 14 tablet; Refill: 0    She did not want to discuss birth control today, but would recommend in the future.  Information on COVID19 vaccine provided. She will think about it.             BMI:   Estimated body mass index is 37.73 kg/m  as calculated from the following:    Height as of 3/31/21: 1.695 m (5' 6.73\").    Weight as of this encounter: 108.4 kg (239 lb).           No follow-ups on file.    Brianne Shore DO  [facility] Subjective   [facility] 30 [facility] who presents for the following health issues     HPI     1. Vaginal discharge: 2 days of discharge with odor, has had wet preps positive for BV in the past, she does use condoms and has had used Plan B in the past. Not interested in talking about birth control today.  LMP: 8/14/2021, regular  Pap was normal in 3/2021.    2. Did discuss COVID19 vaccine. She had recently tested positive in 8/2021. She did not receive monoclonal antibodies. She does not need to wait 90 days before getting vaccinated.       Objective    /84   Pulse 75   Resp 18   Wt 108.4 kg (239 " lb)   SpO2 98%   BMI 37.73 kg/m    Body mass index is 37.73 kg/m .  Physical Exam   GENERAL: healthy, alert and no distress  RESP: lungs clear to auscultation - no rales, rhonchi or wheezes  CV: regular rate and rhythm, normal S1 S2, no S3 or S4, no murmur, click or rub, no peripheral edema and peripheral pulses strong   (female): normal female external genitalia, normal urethral meatus, vaginal mucosa, normal cervix/adnexa/uterus with clear discharge  MS: no gross musculoskeletal defects noted, no edema    Results for orders placed or performed in visit on 09/10/21 (from the past 24 hour(s))   HCG qualitative urine   Result Value Ref Range    hCG Urine Qualitative Negative Negative    Narrative    SG ok   Wet preparation    Specimen: Vagina; Swab   Result Value Ref Range    Trichomonas Present (A) Absent    Yeast Present (A) Absent    Clue Cells Present (A) Absent    WBCs/high power field 2+ (A) None    Narrative    no Ph  Odor presented  Many bacterias  Clue cells >20%       \plain

## 2021-09-10 NOTE — PATIENT INSTRUCTIONS
Patient Education     Bacterial Vaginosis    You have a vaginal infection called bacterial vaginosis (BV). Both good and bad bacteria are present in a healthy vagina. BV occurs when these bacteria get out of balance. The number of bad bacteria increase. And the number of good bacteria decrease. BV is linked with sexual activity, but it's not a sexually transmitted infection (STI).   BV may or may not cause symptoms. If symptoms do occur, they can include:     Thin, gray, milky-white, or sometimes green discharge    Unpleasant odor or  fishy  smell    Itching, burning, or pain in or around the vagina  It is not known what causes BV, but certain factors can make the problem more likely. These can include:     Douching    Spermicides    Use of antibiotics    Change in hormone levels with pregnancy, breastfeeding, or menopause    Having sex with a new partner    Having sex with more than one partner  BV will sometimes go away on its own. But treatment is often advised. This is because untreated BV can raise the risk of more serious health problems such as:     Pelvic inflammatory disease (PID)     delivery (giving birth to a baby early if you re pregnant)    HIV and some other sexually transmitted infections (STIs)    Infection after surgery on the reproductive organs  Home care  General care    BV is most often treated with medicines called antibiotics. These may be given as pills or as a vaginal cream. If antibiotics are prescribed, be sure to use them exactly as directed. And complete all of the medicine, even if your symptoms go away.    Don't douche or having sex during treatment.    If you have sex with a female partner, ask your healthcare provider if she should also be treated.  Prevention    Don't douche.    Don't have sex. If you do have sex, then take steps to lower your risk:  ? Use condoms when having sex.  ? Limit the number of sex partners you have.    Follow-up care  Follow up with your  healthcare provider, or as advised.   When to get medical advice  Call your healthcare provider right away if:     You have a fever of 100.4 F (38 C) or higher, or as directed by your provider.    Your symptoms get worse, or they don t go away within a few days of starting treatment.    You have new pain in the lower belly or pelvic region.    You have side effects that bother you or a reaction to the pills or cream you re prescribed.    You or any of your sex partners have new symptoms, such as a rash, joint pain, or sores.  [employer] last reviewed this educational content on 6/1/2020 2000-2021 [employer]. All rights reserved. This information is not intended as a substitute for professional medical care. Always follow your healthcare professional's instructions.

## 2021-09-12 LAB
C TRACH DNA SPEC QL NAA+PROBE: NEGATIVE
N GONORRHOEA DNA SPEC QL NAA+PROBE: NEGATIVE

## 2021-12-23 ENCOUNTER — OFFICE VISIT (OUTPATIENT)
Dept: FAMILY MEDICINE | Facility: CLINIC | Age: 31
End: 2021-12-23
Payer: COMMERCIAL

## 2021-12-23 VITALS
SYSTOLIC BLOOD PRESSURE: 127 MMHG | DIASTOLIC BLOOD PRESSURE: 83 MMHG | WEIGHT: 241 LBS | RESPIRATION RATE: 16 BRPM | OXYGEN SATURATION: 99 % | BODY MASS INDEX: 38.73 KG/M2 | HEART RATE: 76 BPM | TEMPERATURE: 97.7 F | HEIGHT: 66 IN

## 2021-12-23 DIAGNOSIS — N76.0 BACTERIAL VAGINOSIS: ICD-10-CM

## 2021-12-23 DIAGNOSIS — Z11.3 ROUTINE SCREENING FOR STI (SEXUALLY TRANSMITTED INFECTION): ICD-10-CM

## 2021-12-23 DIAGNOSIS — L73.2 HIDRADENITIS SUPPURATIVA: Primary | ICD-10-CM

## 2021-12-23 DIAGNOSIS — B37.31 YEAST INFECTION OF THE VAGINA: ICD-10-CM

## 2021-12-23 DIAGNOSIS — B96.89 BACTERIAL VAGINOSIS: ICD-10-CM

## 2021-12-23 LAB
CLUE CELLS: PRESENT
HIV 1+2 AB+HIV1 P24 AG SERPL QL IA: NEGATIVE
TRICHOMONAS, WET PREP: ABNORMAL
WBC'S/HIGH POWER FIELD, WET PREP: ABNORMAL
YEAST, WET PREP: PRESENT

## 2021-12-23 PROCEDURE — 87389 HIV-1 AG W/HIV-1&-2 AB AG IA: CPT | Performed by: STUDENT IN AN ORGANIZED HEALTH CARE EDUCATION/TRAINING PROGRAM

## 2021-12-23 PROCEDURE — 36415 COLL VENOUS BLD VENIPUNCTURE: CPT | Performed by: STUDENT IN AN ORGANIZED HEALTH CARE EDUCATION/TRAINING PROGRAM

## 2021-12-23 PROCEDURE — 86780 TREPONEMA PALLIDUM: CPT | Performed by: STUDENT IN AN ORGANIZED HEALTH CARE EDUCATION/TRAINING PROGRAM

## 2021-12-23 PROCEDURE — 87591 N.GONORRHOEAE DNA AMP PROB: CPT | Performed by: STUDENT IN AN ORGANIZED HEALTH CARE EDUCATION/TRAINING PROGRAM

## 2021-12-23 PROCEDURE — 87210 SMEAR WET MOUNT SALINE/INK: CPT | Performed by: STUDENT IN AN ORGANIZED HEALTH CARE EDUCATION/TRAINING PROGRAM

## 2021-12-23 PROCEDURE — 99214 OFFICE O/P EST MOD 30 MIN: CPT | Mod: GC | Performed by: STUDENT IN AN ORGANIZED HEALTH CARE EDUCATION/TRAINING PROGRAM

## 2021-12-23 PROCEDURE — 87491 CHLMYD TRACH DNA AMP PROBE: CPT | Performed by: STUDENT IN AN ORGANIZED HEALTH CARE EDUCATION/TRAINING PROGRAM

## 2021-12-23 RX ORDER — METRONIDAZOLE 500 MG/1
500 TABLET ORAL 2 TIMES DAILY
Qty: 14 TABLET | Refills: 0 | Status: SHIPPED | OUTPATIENT
Start: 2021-12-23 | End: 2021-12-30

## 2021-12-23 RX ORDER — FLUCONAZOLE 150 MG/1
150 TABLET ORAL ONCE
Qty: 1 TABLET | Refills: 0 | Status: SHIPPED | OUTPATIENT
Start: 2021-12-23 | End: 2021-12-23

## 2021-12-23 ASSESSMENT — MIFFLIN-ST. JEOR: SCORE: 1825.92

## 2021-12-23 NOTE — PATIENT INSTRUCTIONS
Patient Education     Understanding Hidradenitis Suppurativa  Hidradenitis suppurativa is a long-term (chronic) skin disease. It causes painful bumps and sores (abscesses) to form around a hair follicle. Follicles are the tiny holes from which hair grows out of your skin. The disease occurs on parts of the body where skin rubs together. It most often appears in the armpits, the groin area, and under the breasts. It's more common in women.    How to say it  [id number]  What causes hidradenitis suppurativa?  This skin disease happens when hair follicles become clogged with keratin. Keratin is the protein that makes up your hair and nails. The follicles then burst and become inflamed . Pressure or rubbing on the skin can clog the follicles. Or it can further irritate them.   The disease tends to run in families. It s also more likely to occur in people who are obese, have diabetes, or smoke. Hormones or the immune system may also play a part.   Symptoms of hidradenitis suppurativa  This skin disease causes one or more painful red bumps on the skin. These bumps become inflamed and drain pus. They may also itch or burn. In severe cases, sinus tracts may form. These are narrow channels that run under the skin. Blood or a bad-smelling pus may ooze from these bumps or sinus tracts. Bands of scarring often occur.   Treatment for hidradenitis suppurativa  Treatment for this skin disease is most successful when started early. But it may be hard to diagnose. It may be mistaken for other skin conditions. The painful bumps also often return. So stopping new bumps and limiting scarring is important. Treatment options include:     Warm compress. Putting a warm, wet washcloth on the affected skin may help.    Lifestyle changes. Your symptoms may get better if you lose weight or stop smoking, if needed. Also avoid shaving or other irritants, such as deodorant or perfume.    Antibiotics. For mild cases, an  antibiotic for the skin (topical) may help. You may need oral antibiotics if you have a severe case. They can help prevent further infection.    Other oral medicines. Over-the-counter pain medicines can ease pain and inflammation. You may need stronger medicines for a severe case. These medicines include corticosteroids or a retinoid. These may cause side effects.    Injected medicines. A steroid may be injected into the bump to ease pain. A newer biologic medicine may be injected to ease severe symptoms.    Surgery. Surgery can drain and remove the painful bumps. For severe cases, the doctor may cut out the entire area of affected skin or destroy it with a laser.  Possible complications of hidradenitis suppurativa   These include:    Arthritis    Depression    Lymphedema    Scarring of skin    Skin cancer  When to call your healthcare provider  Call your healthcare provider right away if you have any of these:    Fever of 100.4 F (38 C) or higher, or as directed by your healthcare provider    Redness, swelling, or fluid leaking from your rash that gets worse    Pain that gets worse    Symptoms that don t get better, or get worse    New symptoms  [employer] last reviewed this educational content on 6/1/2019 2000-2021 [employer]. All rights reserved. This information is not intended as a substitute for professional medical care. Always follow your healthcare professional's instructions.

## 2021-12-24 LAB
C TRACH DNA SPEC QL NAA+PROBE: NEGATIVE
N GONORRHOEA DNA SPEC QL NAA+PROBE: NEGATIVE
T PALLIDUM AB SER QL: NONREACTIVE

## 2021-12-25 NOTE — PROGRESS NOTES
Preceptor Attestation:  Patient's case reviewed and discussed with Satish Amos MD resident and I evaluated the patient. I agree with written assessment and plan of care.  Supervising Physician:  DENNISE SOLANO MD  [facility]

## 2022-03-04 ENCOUNTER — OFFICE VISIT (OUTPATIENT)
Dept: FAMILY MEDICINE | Facility: CLINIC | Age: 32
End: 2022-03-04
Payer: COMMERCIAL

## 2022-03-04 VITALS
HEART RATE: 70 BPM | DIASTOLIC BLOOD PRESSURE: 86 MMHG | BODY MASS INDEX: 39.5 KG/M2 | HEIGHT: 66 IN | SYSTOLIC BLOOD PRESSURE: 137 MMHG | WEIGHT: 245.8 LBS | TEMPERATURE: 98.3 F | OXYGEN SATURATION: 97 % | RESPIRATION RATE: 12 BRPM

## 2022-03-04 DIAGNOSIS — N89.8 VAGINAL DISCHARGE: Primary | ICD-10-CM

## 2022-03-04 LAB
CLUE CELLS: ABNORMAL
TRICHOMONAS, WET PREP: ABNORMAL
WBC'S/HIGH POWER FIELD, WET PREP: ABNORMAL
YEAST, WET PREP: ABNORMAL

## 2022-03-04 PROCEDURE — 87210 SMEAR WET MOUNT SALINE/INK: CPT | Performed by: STUDENT IN AN ORGANIZED HEALTH CARE EDUCATION/TRAINING PROGRAM

## 2022-03-04 PROCEDURE — 99213 OFFICE O/P EST LOW 20 MIN: CPT | Mod: GC | Performed by: STUDENT IN AN ORGANIZED HEALTH CARE EDUCATION/TRAINING PROGRAM

## 2022-03-04 RX ORDER — METRONIDAZOLE 500 MG/1
500 TABLET ORAL 2 TIMES DAILY
Qty: 14 TABLET | Refills: 3 | Status: SHIPPED | OUTPATIENT
Start: 2022-03-04 | End: 2022-03-11

## 2022-03-04 RX ORDER — FLUCONAZOLE 150 MG/1
150 TABLET ORAL ONCE
Qty: 1 TABLET | Refills: 3 | Status: SHIPPED | OUTPATIENT
Start: 2022-03-04 | End: 2022-03-04

## 2022-03-08 NOTE — PROGRESS NOTES
CHIEF COMPLAINT                                                      Chief Complaint   Patient presents with     Personal     Discuss personal     SUBJECTIVE:                                                    Ochoa Peters is a 31 year old year old female who presents to clinic today for the following health issues:    Patient a longstanding history of frequent BV and yeast infections which she has required treatment for in the past  -Feels like she has the start of her typical yeast infection and would like this treated  -Denies any new sexual partners  -Denies any pain associated with this  -No fevers or sign of a UTI    Patient is an established patient of this clinic.  ----------------------------------------------------------------------------------------------------------------------  Patient Active Problem List   Diagnosis     Encounter for cervical Pap smear with pelvic exam     Past Surgical History:   Procedure Laterality Date     C EACH ADD TOOTH EXTRACTION       NO HISTORY OF SURGERY         Social History     Tobacco Use     Smoking status: Never Smoker     Smokeless tobacco: Never Used   Substance Use Topics     Alcohol use: Yes     Comment: Rare     Family History   Problem Relation Age of Onset     Hypertension Mother      Diabetes No family hx of      Cancer No family hx of          Problem list and past medical, surgical, social, and family histories reviewed & adjusted, as indicated.    Current Outpatient Medications   Medication Sig Dispense Refill     metroNIDAZOLE (FLAGYL) 500 MG tablet Take 1 tablet (500 mg) by mouth 2 times daily for 7 days 14 tablet 3     omeprazole (PRILOSEC) 20 MG DR capsule Take 20 mg by mouth       chlorhexidine (HIBICLENS) 4 % liquid Apply as needed to buttocks/groin (avoid the vaginal area) (Patient not taking: Reported on 3/4/2022) 236 mL 1     ibuprofen (ADVIL/MOTRIN) 200 MG tablet Take 400 mg by mouth (Patient not taking: Reported on 3/4/2022)    "    Medication list reviewed and updated as indicated.    No Known Allergies  Allergies reviewed and updated as indicated.  ----------------------------------------------------------------------------------------------------------------------  ROS:  Constitutional, HEENT, cardiovascular, pulmonary, GI, musculoskeletal, neuro, skin, and psych systems are negative, except as otherwise noted.    OBJECTIVE:     /86 (BP Location: Right arm, Patient Position: Sitting, Cuff Size: Adult Large)   Pulse 70   Temp 98.3  F (36.8  C) (Oral)   Resp 12   Ht 1.676 m (5' 6\")   Wt 111.5 kg (245 lb 12.8 oz)   SpO2 97%   BMI 39.67 kg/m    Body mass index is 39.67 kg/m .  Exam:  Constitutional: healthy, alert and no distress  Cardiovascular: negative, PMI normal. No lifts, heaves, or thrills. RRR. No murmurs, clicks gallops or rub  Respiratory: negative, Percussion normal. Good diaphragmatic excursion. Lungs clear  Gastrointestinal: Abdomen soft, non-tender. BS normal. No masses, organomegaly  : Deferred  Musculoskeletal: extremities normal- no gross deformities noted, gait normal and normal muscle tone  Skin: no suspicious lesions or rashes  Neurologic: Gait normal. Reflexes normal and symmetric. Sensation grossly WNL.  Psychiatric: mentation appears normal and affect normal/bright    Diagnostic Test Results:  Results for orders placed or performed in visit on 03/04/22   Wet preparation     Status: Abnormal    Specimen: Vagina; Swab   Result Value Ref Range    Trichomonas Absent Absent    Yeast Absent Absent    Clue Cells Absent Absent    WBCs/high power field 1+ (A) None       ASSESSMENT/PLAN:     (N89.8) Vaginal discharge  (primary encounter diagnosis)  -Patient has an extensive history of both BV and yeast  -Given her proven history it was felt appropriate to send treatment that she can fill PRN with follow  Up if her symptoms fail to improve  -Sent refills for metronidazole and fluconazole  -Wet prep then returned " negative and a pH had not been obtained during the self swab  -Reached out to patient to let her know and recommended returning to the clinic for further evaluation if her symptoms failed to get better  -While she denied any new sexual partners the concern would be for possible STI as her wet prep was normal  -The alternative would be that this is early in her infection or she did not adequately self swab  -Patient understands reasons to return     There are no discontinued medications.    Options for treatment and follow-up care were reviewed with the patient and/or guardian. Ochoa Peters and/or guardian engaged in the decision making process and verbalized understanding of the options discussed and agreed with the final plan    Precepted with Dr. Siegel.    Satish Amos MD on 3/8/2022 at 4:41 PM

## 2022-03-12 NOTE — PROGRESS NOTES
Preceptor Attestation:   Patient seen, evaluated and discussed with the resident. I have verified the content of the note, which accurately reflects my assessment of the patient and the plan of care.  Supervising Physician:Lou Siegel MD  [facility]

## 2022-03-27 ENCOUNTER — HEALTH MAINTENANCE LETTER (OUTPATIENT)
Age: 32
End: 2022-03-27

## 2022-06-22 ENCOUNTER — LAB (OUTPATIENT)
Dept: FAMILY MEDICINE | Facility: CLINIC | Age: 32
End: 2022-06-22
Payer: COMMERCIAL

## 2022-06-22 DIAGNOSIS — Z20.822 ENCOUNTER FOR LABORATORY TESTING FOR COVID-19 VIRUS: ICD-10-CM

## 2022-06-22 LAB — SARS-COV-2 RNA RESP QL NAA+PROBE: NEGATIVE

## 2022-06-22 PROCEDURE — U0003 INFECTIOUS AGENT DETECTION BY NUCLEIC ACID (DNA OR RNA); SEVERE ACUTE RESPIRATORY SYNDROME CORONAVIRUS 2 (SARS-COV-2) (CORONAVIRUS DISEASE [COVID-19]), AMPLIFIED PROBE TECHNIQUE, MAKING USE OF HIGH THROUGHPUT TECHNOLOGIES AS DESCRIBED BY CMS-2020-01-R: HCPCS

## 2022-06-22 PROCEDURE — U0005 INFEC AGEN DETEC AMPLI PROBE: HCPCS

## 2022-08-23 ENCOUNTER — OFFICE VISIT (OUTPATIENT)
Dept: FAMILY MEDICINE | Facility: CLINIC | Age: 32
End: 2022-08-23
Payer: COMMERCIAL

## 2022-08-23 VITALS
DIASTOLIC BLOOD PRESSURE: 84 MMHG | SYSTOLIC BLOOD PRESSURE: 138 MMHG | HEIGHT: 69 IN | BODY MASS INDEX: 35.42 KG/M2 | WEIGHT: 239.12 LBS | HEART RATE: 89 BPM | OXYGEN SATURATION: 99 %

## 2022-08-23 DIAGNOSIS — Z13.1 SCREENING FOR DIABETES MELLITUS: ICD-10-CM

## 2022-08-23 DIAGNOSIS — Z11.59 ENCOUNTER FOR HEPATITIS C SCREENING TEST FOR LOW RISK PATIENT: ICD-10-CM

## 2022-08-23 DIAGNOSIS — Z13.220 SCREENING FOR HYPERLIPIDEMIA: ICD-10-CM

## 2022-08-23 DIAGNOSIS — Z00.00 ROUTINE GENERAL MEDICAL EXAMINATION AT A HEALTH CARE FACILITY: Primary | ICD-10-CM

## 2022-08-23 DIAGNOSIS — E66.812 CLASS 2 OBESITY WITHOUT SERIOUS COMORBIDITY WITH BODY MASS INDEX (BMI) OF 35.0 TO 35.9 IN ADULT, UNSPECIFIED OBESITY TYPE: ICD-10-CM

## 2022-08-23 LAB
CHOLEST SERPL-MCNC: 151 MG/DL
HBA1C MFR BLD: 5.4 % (ref 0–5.6)
HDLC SERPL-MCNC: 52 MG/DL
LDLC SERPL CALC-MCNC: 81 MG/DL
NONHDLC SERPL-MCNC: 99 MG/DL
TRIGL SERPL-MCNC: 92 MG/DL

## 2022-08-23 PROCEDURE — 83036 HEMOGLOBIN GLYCOSYLATED A1C: CPT

## 2022-08-23 PROCEDURE — 80061 LIPID PANEL: CPT

## 2022-08-23 PROCEDURE — 36415 COLL VENOUS BLD VENIPUNCTURE: CPT

## 2022-08-23 PROCEDURE — 99395 PREV VISIT EST AGE 18-39: CPT | Mod: GC

## 2022-08-23 PROCEDURE — 86803 HEPATITIS C AB TEST: CPT

## 2022-08-23 ASSESSMENT — ENCOUNTER SYMPTOMS
MYALGIAS: 0
HEMATOCHEZIA: 0
EYE PAIN: 0
NERVOUS/ANXIOUS: 0
PALPITATIONS: 0
WEAKNESS: 0
CHILLS: 0
JOINT SWELLING: 0
ABDOMINAL PAIN: 0
DYSURIA: 0
HEARTBURN: 1
HEMATURIA: 0
CONSTIPATION: 0
HEADACHES: 0
DIZZINESS: 0
FREQUENCY: 0
BREAST MASS: 0
FEVER: 0
DIARRHEA: 0
SORE THROAT: 0
ARTHRALGIAS: 0
COUGH: 0
NAUSEA: 0
SHORTNESS OF BREATH: 0
PARESTHESIAS: 0

## 2022-08-23 NOTE — PROGRESS NOTES
SUBJECTIVE:   CC: Ochoa Peters is an 31 year old woman who presents for preventive health visit.     HPI  Patient has no concerns today.     Today's PHQ-2 Score:   PHQ-2 (  [employer]) 2022   Q1: Little interest or pleasure in doing things -   Q2: Feeling down, depressed or hopeless 0   PHQ-2 Score -   PHQ-2 Total Score (12-17 Years)- Positive if 3 or more points; Administer PHQ-A if positive -   Q1: Little interest or pleasure in doing things Not at all   Q2: Feeling down, depressed or hopeless Not at all   PHQ-2 Score 0     Abuse: Current or Past (Physical, Sexual or Emotional) - No  Do you feel safe in your environment? No    Social History     Tobacco Use     Smoking status: Never Smoker     Smokeless tobacco: Never Used   Substance Use Topics     Alcohol use: Yes     Comment: Rare     Alcohol Use 2022   Prescreen: >3 drinks/day or >7 drinks/week? Yes   AUDIT SCORE  6     Reviewed orders with patient.  Reviewed health maintenance and updated orders accordingly - Yes    Sexually Active: Yes  Sexual concerns: No   Contraception: not needed   P:1001  Menarche: Regular periods, no concerns regarding her period. Patient's last menstrual period was 2022.  STD History: Negative   Last Pap Smear Date: 2021   Abnormal Pap History: None    History reviewed. No pertinent past medical history.     Review of Systems  CONSTITUTIONAL: NEGATIVE for fever, chills, change in weight  INTEGUMENTARU/SKIN: NEGATIVE for worrisome rashes, moles or lesions  EYES: NEGATIVE for vision changes or irritation  ENT: NEGATIVE for ear, mouth and throat problems  RESP: NEGATIVE for significant cough or SOB  BREAST: NEGATIVE for masses, tenderness or discharge  CV: NEGATIVE for chest pain, palpitations or peripheral edema  GI: NEGATIVE for nausea, abdominal pain, heartburn, or change in bowel habits  : NEGATIVE for unusual urinary or vaginal symptoms. Periods are regular.  MUSCULOSKELETAL: NEGATIVE for  "significant arthralgias or myalgia  NEURO: NEGATIVE for weakness, dizziness or paresthesias  PSYCHIATRIC: NEGATIVE for changes in mood or affect     OBJECTIVE:   /84   Pulse 89   Ht 1.75 m (5' 8.9\")   Wt 108.5 kg (239 lb 1.9 oz)   LMP 08/11/2022   SpO2 99%   Breastfeeding No   BMI 35.42 kg/m    Physical Exam  GENERAL: healthy, alert and no distress  EYES: Eyes grossly normal to inspection, PERRL and conjunctivae and sclerae normal  HENT: ear canals and TM's normal, nose and mouth without ulcers or lesions  NECK: no adenopathy, no asymmetry, masses, or scars and thyroid normal to palpation  RESP: lungs clear to auscultation - no rales, rhonchi or wheezes  CV: regular rate and rhythm, normal S1 S2, no S3 or S4, no murmur, click or rub, no peripheral edema and peripheral pulses strong  ABDOMEN: soft, nontender, no hepatosplenomegaly, no masses and bowel sounds normal  MS: no gross musculoskeletal defects noted, no edema  SKIN: no suspicious lesions or rashes  NEURO: Normal strength and tone, mentation intact and speech normal  PSYCH: mentation appears normal, affect normal/bright    ASSESSMENT/PLAN:       ICD-10-CM    1. Routine general medical examination at a health care facility  Z00.00 Lipid panel     Hemoglobin A1c     Hepatitis C antibody     Lipid panel     Hemoglobin A1c     Hepatitis C antibody   2. Class 2 obesity without serious comorbidity with body mass index (BMI) of 35.0 to 35.9 in adult, unspecified obesity type  E66.9 Lipid panel    Z68.35 Hemoglobin A1c     Lipid panel     Hemoglobin A1c   3. Screening for diabetes mellitus  Z13.1 Hemoglobin A1c     Hemoglobin A1c   4. Screening for hyperlipidemia  Z13.220 Lipid panel     Lipid panel   5. Encounter for hepatitis C screening test for low risk patient  Z11.59 Hepatitis C antibody     Hepatitis C antibody     COUNSELING:  Reviewed preventive health counseling, as reflected in patient instructions  Special attention given to:        Regular " "exercise       Healthy diet/nutrition       Alcohol Use       Contraception       Consider Hep C screening for all patients one time for ages 18-79 years    Estimated body mass index is 35.42 kg/m  as calculated from the following:    Height as of this encounter: 1.75 m (5' 8.9\").    Weight as of this encounter: 108.5 kg (239 lb 1.9 oz).    Weight management plan: Discussed healthy diet and exercise guidelines    She reports that she has never smoked. She has never used smokeless tobacco.    Kathleen Green MD  [facility]    "

## 2022-08-24 LAB — HCV AB SERPL QL IA: NONREACTIVE

## 2022-08-24 NOTE — PROGRESS NOTES
Preceptor Attestation:  Patient's case reviewed and discussed with the resident, Kathleen Green MD, and I personally evaluated the patient. I agree with written assessment and plan of care.    Supervising Physician:  Ramona Varghese MD   [facility]

## 2022-09-23 ENCOUNTER — OFFICE VISIT (OUTPATIENT)
Dept: FAMILY MEDICINE | Facility: CLINIC | Age: 32
End: 2022-09-23
Payer: COMMERCIAL

## 2022-09-23 VITALS
DIASTOLIC BLOOD PRESSURE: 82 MMHG | HEIGHT: 65 IN | HEART RATE: 90 BPM | RESPIRATION RATE: 20 BRPM | WEIGHT: 238 LBS | OXYGEN SATURATION: 98 % | SYSTOLIC BLOOD PRESSURE: 127 MMHG | BODY MASS INDEX: 39.65 KG/M2 | TEMPERATURE: 98.2 F

## 2022-09-23 DIAGNOSIS — B96.89 BV (BACTERIAL VAGINOSIS): ICD-10-CM

## 2022-09-23 DIAGNOSIS — Z30.09 FAMILY PLANNING COUNSELING: ICD-10-CM

## 2022-09-23 DIAGNOSIS — A59.9 TRICHIMONIASIS: Primary | ICD-10-CM

## 2022-09-23 DIAGNOSIS — N76.0 BV (BACTERIAL VAGINOSIS): ICD-10-CM

## 2022-09-23 DIAGNOSIS — Z11.3 SCREEN FOR STD (SEXUALLY TRANSMITTED DISEASE): Primary | ICD-10-CM

## 2022-09-23 LAB
CLUE CELLS: PRESENT
TRICHOMONAS, WET PREP: PRESENT
WBC'S/HIGH POWER FIELD, WET PREP: ABNORMAL
YEAST, WET PREP: ABNORMAL

## 2022-09-23 PROCEDURE — 36415 COLL VENOUS BLD VENIPUNCTURE: CPT | Performed by: STUDENT IN AN ORGANIZED HEALTH CARE EDUCATION/TRAINING PROGRAM

## 2022-09-23 PROCEDURE — 86780 TREPONEMA PALLIDUM: CPT | Performed by: STUDENT IN AN ORGANIZED HEALTH CARE EDUCATION/TRAINING PROGRAM

## 2022-09-23 PROCEDURE — 87491 CHLMYD TRACH DNA AMP PROBE: CPT | Performed by: STUDENT IN AN ORGANIZED HEALTH CARE EDUCATION/TRAINING PROGRAM

## 2022-09-23 PROCEDURE — 87591 N.GONORRHOEAE DNA AMP PROB: CPT | Performed by: STUDENT IN AN ORGANIZED HEALTH CARE EDUCATION/TRAINING PROGRAM

## 2022-09-23 PROCEDURE — 87389 HIV-1 AG W/HIV-1&-2 AB AG IA: CPT | Performed by: STUDENT IN AN ORGANIZED HEALTH CARE EDUCATION/TRAINING PROGRAM

## 2022-09-23 PROCEDURE — 87210 SMEAR WET MOUNT SALINE/INK: CPT | Performed by: STUDENT IN AN ORGANIZED HEALTH CARE EDUCATION/TRAINING PROGRAM

## 2022-09-23 PROCEDURE — 86706 HEP B SURFACE ANTIBODY: CPT | Performed by: STUDENT IN AN ORGANIZED HEALTH CARE EDUCATION/TRAINING PROGRAM

## 2022-09-23 PROCEDURE — 99213 OFFICE O/P EST LOW 20 MIN: CPT | Mod: GC | Performed by: STUDENT IN AN ORGANIZED HEALTH CARE EDUCATION/TRAINING PROGRAM

## 2022-09-23 PROCEDURE — 87340 HEPATITIS B SURFACE AG IA: CPT | Performed by: STUDENT IN AN ORGANIZED HEALTH CARE EDUCATION/TRAINING PROGRAM

## 2022-09-23 RX ORDER — METRONIDAZOLE 500 MG/1
500 TABLET ORAL 2 TIMES DAILY
Qty: 14 TABLET | Refills: 0 | Status: SHIPPED | OUTPATIENT
Start: 2022-09-23 | End: 2022-09-30

## 2022-09-23 NOTE — PROGRESS NOTES
"Assessment and Plan     (Z11.3) Screen for STD (sexually transmitted disease)  (primary encounter diagnosis)  Comment: Asymptomatic but new partner and wants screening. H/o trich a year ago that was negative on retest.  Plan:   -Wet preparation, Neisseria Gonorrhoeae PCR, Chlamydia trachomatis PCR, Syphilis Screen Cascade - RPR, HIV Ag/Ab Screen Cascade, Hepatitis B surface antigen, Hepatitis B Surface Antibody  -Counseled on barrier protection    (Z30.09) Family planning counseling  Comment: Not on BC but using condoms regularly. Not wanting a pregnancy currently. Not on prenatal and does not want to start despite counseling today.  Plan:   -Counseled on BC and prenatal extensively today, does not want either today        Options for treatment and follow-up care were reviewed with the patient and/or guardian. Ochoa Peters and/or guardian engaged in the decision making process and verbalized understanding of the options discussed and agreed with the final plan.    Joel Mueller MD      Precepted today with: Dr Varghese           HPI:       Ochoa Peters is a 31 year old  female with pertinent hx of prior trichomonas (treated 1 year ago) who presents for STD check:    Had HCV testing at last visit.    Todays visit:  New partner but no unprotected intercourse yet.  Otherwise hasn't had any other partners in the last year.  Denies any vaginal or urinary sx.  \"I have a h/o BV and trich\"  Not on BC and does not want to be. Just using condoms.   Is not on a prenatal.  Has regular periods monthly that last ~5 days. No heavy flow issues.         PMHX:     Patient Active Problem List   Diagnosis     Encounter for cervical Pap smear with pelvic exam       Current Outpatient Medications   Medication Sig Dispense Refill     omeprazole (PRILOSEC) 20 MG DR capsule Take 20 mg by mouth (Patient not taking: Reported on 9/23/2022)         Social History     Tobacco Use     Smoking status: Never Smoker     " "Smokeless tobacco: Never Used   Substance Use Topics     Alcohol use: Yes     Comment: Rare     Drug use: Never          Allergies   Allergen Reactions     No Known Allergies        No results found for this or any previous visit (from the past 24 hour(s)).         Review of Systems:     10 point ROS negative except for what is noted in HPI          Physical Exam:     Vitals:    09/23/22 1513   BP: 127/82   Pulse: 90   Resp: 20   Temp: 98.2  F (36.8  C)   SpO2: 98%   Weight: 108 kg (238 lb)   Height: 1.651 m (5' 5\")     Body mass index is 39.61 kg/m .    General: Sitting comfortably. No acute distress.   HEENT: Conjunctivae are clear without discharge.   Respiratory: No respiratory distress. Lung sounds are clear without rales, ronchi, or wheezes.   Cardiac: Warm and well perfused. Brisk cap refill.  Abdominal: Abdomen is soft and non-tender without distention.  : Normal vaginal walls without erythema or uclers. Normal physiologic discharge. Cervix appears normal without erythema.  Extremities: Upper and lower extremities grossly normal.  Skin: Skin in warm without rashes.  Neurological: Motor function is grossly normal. Normal mentation.  Psychiatric: Good insight. Normal affect.    "

## 2022-09-23 NOTE — PROGRESS NOTES
Preceptor Attestation:  Patient's case reviewed and discussed with the resident, Joel Mueller MD, and I personally evaluated the patient. I agree with written assessment and plan of care.    Supervising Physician:  Ramona Varghese MD   [facility]

## 2022-09-24 ENCOUNTER — HEALTH MAINTENANCE LETTER (OUTPATIENT)
Age: 32
End: 2022-09-24

## 2022-09-26 LAB
HBV SURFACE AB SERPL IA-ACNC: >1000 M[IU]/ML
HBV SURFACE AB SERPL IA-ACNC: REACTIVE M[IU]/ML
HBV SURFACE AG SERPL QL IA: NONREACTIVE
HIV 1+2 AB+HIV1 P24 AG SERPL QL IA: NONREACTIVE

## 2023-02-16 ENCOUNTER — OFFICE VISIT (OUTPATIENT)
Dept: FAMILY MEDICINE | Facility: CLINIC | Age: 33
End: 2023-02-16
Payer: COMMERCIAL

## 2023-02-16 VITALS
SYSTOLIC BLOOD PRESSURE: 123 MMHG | HEIGHT: 66 IN | DIASTOLIC BLOOD PRESSURE: 85 MMHG | OXYGEN SATURATION: 98 % | BODY MASS INDEX: 38.25 KG/M2 | TEMPERATURE: 98 F | HEART RATE: 72 BPM | RESPIRATION RATE: 18 BRPM | WEIGHT: 238 LBS

## 2023-02-16 DIAGNOSIS — N76.0 BACTERIAL VAGINOSIS: Primary | ICD-10-CM

## 2023-02-16 DIAGNOSIS — B96.89 BACTERIAL VAGINOSIS: Primary | ICD-10-CM

## 2023-02-16 LAB
CLUE CELLS: PRESENT
TRICHOMONAS, WET PREP: ABNORMAL
WBC'S/HIGH POWER FIELD, WET PREP: ABNORMAL
YEAST, WET PREP: ABNORMAL

## 2023-02-16 PROCEDURE — 87210 SMEAR WET MOUNT SALINE/INK: CPT

## 2023-02-16 PROCEDURE — 99213 OFFICE O/P EST LOW 20 MIN: CPT | Mod: GC

## 2023-02-16 RX ORDER — METRONIDAZOLE 500 MG/1
500 TABLET ORAL 2 TIMES DAILY
Qty: 14 TABLET | Refills: 0 | Status: SHIPPED | OUTPATIENT
Start: 2023-02-16 | End: 2023-02-23

## 2023-02-16 NOTE — PROGRESS NOTES
"  Assessment & Plan     Bacterial vaginosis  Patient presents with two days of vaginal irritation; no fevers, rash, abdominal pain or urinary symptoms. Patient has a history of previous yeast infections, BV and trichomonas. Wet prep obtained in clinic showed clue cells. Discussed results with patient; prescribed two week course of metronidazole BID.   - Wet prep  - metroNIDAZOLE (FLAGYL) 500 MG tablet  Dispense: 14 tablet; Refill: 0    Kathleen Green MD  [facility]    Guerrero Packer is a 32 year old presenting for the following health issues:  Vaginal Problem (BV or yeast)    HPI     Vaginal irritation symptoms   Onset: Symptoms started on Tuesday 2/14/22  Symptoms:   - Vaginal itching: Yes    - Vaginal discharge: clear discharge, no abnormal odor   - Rash: No  - Dysuria: No   - Hematuria: No   - Increased urinary frequency: Yes   - Urinary urgency: No   LMP: 1/28/23   STD exposure: No   History of BV, trichomonas, and yeast infections.   ROS: Denies fever/chills, dizziness, abdominal pain, nausea, vomiting    Review of Systems   Constitutional, HEENT, cardiovascular, pulmonary, gi and gu systems are negative, except as otherwise noted.      Objective    /85   Pulse 72   Temp 98  F (36.7  C)   Resp 18   Ht 1.676 m (5' 6\")   Wt 108 kg (238 lb)   SpO2 98%   BMI 38.41 kg/m    Body mass index is 38.41 kg/m .  Physical Exam   GENERAL: Healthy, alert and no distress  EYES: Eyes grossly normal to inspection.  No discharge or erythema, or obvious scleral/conjunctival abnormalities.  RESP: No audible wheeze, cough, or visible cyanosis.  No visible retractions or increased work of breathing.    SKIN: Visible skin clear. No significant rash, abnormal pigmentation or lesions.  NEURO: Cranial nerves grossly intact.  Mentation and speech appropriate for age.  PSYCH: Mentation appears normal, affect normal/bright, judgement and insight intact, normal speech and appearance " well-groomed.    Results for orders placed or performed in visit on 02/16/23 (from the past 24 hour(s))   Wet prep    Specimen: Vagina; Swab   Result Value Ref Range    Trichomonas Absent Absent    Yeast Absent Absent    Clue Cells Present (A) Absent    WBCs/high power field 2+ (A) None    Narrative    Clue cell >20% bacteria present no ph

## 2023-02-16 NOTE — PROGRESS NOTES
Preceptor Attestation:   Patient seen, evaluated and discussed with the resident Dr. Green. I have verified the content of the note, which accurately reflects my assessment of the patient and the plan of care.  Supervising Physician:Benjamin Rosenstein, MD, MA  [facility]

## 2023-08-03 ENCOUNTER — OFFICE VISIT (OUTPATIENT)
Dept: FAMILY MEDICINE | Facility: CLINIC | Age: 33
End: 2023-08-03
Payer: COMMERCIAL

## 2023-08-03 VITALS
TEMPERATURE: 98.7 F | WEIGHT: 237 LBS | BODY MASS INDEX: 38.09 KG/M2 | RESPIRATION RATE: 10 BRPM | OXYGEN SATURATION: 99 % | HEIGHT: 66 IN | HEART RATE: 75 BPM | DIASTOLIC BLOOD PRESSURE: 78 MMHG | SYSTOLIC BLOOD PRESSURE: 123 MMHG

## 2023-08-03 DIAGNOSIS — L02.31 CUTANEOUS ABSCESS OF BUTTOCK: Primary | ICD-10-CM

## 2023-08-03 PROBLEM — L02.32 BOIL OF BUTTOCK: Status: ACTIVE | Noted: 2023-08-03

## 2023-08-03 PROCEDURE — 99213 OFFICE O/P EST LOW 20 MIN: CPT | Mod: GC

## 2023-08-03 RX ORDER — DOXYCYCLINE 100 MG/1
100 CAPSULE ORAL 2 TIMES DAILY
Qty: 14 CAPSULE | Refills: 0 | Status: SHIPPED | OUTPATIENT
Start: 2023-08-03 | End: 2024-06-27

## 2023-08-03 NOTE — PROGRESS NOTES
Preceptor Attestation:   Patient seen, evaluated and discussed with the resident. I have verified the content of the note, which accurately reflects my assessment of the patient and the plan of care.    Supervising Physician:Ruth Byrne MD    [facility]

## 2023-08-03 NOTE — PROGRESS NOTES
"  Assessment & Plan     Cutaneous abscess of buttock  Ochoa is a 31 yo presenting with 2 days of a painful boil in her inner left gluteal region. The differential includes cutaneous abscess vs hidradenitis suppurativa vs pilonidal cyst vs anorectal abscess. The most likely appears to be a general cutaneous abscess due to the recurrent nature of the lesion, location, and appearance on exam. Due to recurrent nature, will treat with systemic antibiotics. Do not think that an I&D is indicated today due to small size of the lesion.   - doxycycline hyclate (VIBRAMYCIN) 100 MG capsule; Take 1 capsule (100 mg) by mouth 2 times daily    Heath Patel. MS3    I was present with the medical student who participated in the service and in the documentation of this note. I have verified the history and personally performed the physical exam and medical decision making, and have verified the content of the note, which accurately reflects my assessment of the patient and the plan of care.     Kathleen Green MD  [facility] Medicine Residency, PGY-3    Subjective   Ochoa is a 32 year old, presenting for the following health issues: boil on buttocks.    HPI     Ochoa is a 31 yo presenting with 2 days of a painful boil in her inner left gluteal region. Ochoa has had similar boils in the past (4 in the past month), with the most recent being in July. The boils have been in the different locations, but localized to the gluteal region. Popping of the boil relieves pain. The lesions pop after she applies a warm compress. The patient isn't having any infectious symptoms such as fever or chills and has no concerns of STI or sick exposures.    Review of Systems   Constitutional, HEENT, cardiovascular, pulmonary, GI, , musculoskeletal, neuro, skin, endocrine and psych systems are negative, except as otherwise noted.      Objective    /78   Pulse 75   Temp 98.7  F (37.1  C) (Tympanic)   Resp 10   Ht 1.676 m (5' 6\")  "  Wt 107.5 kg (237 lb)   LMP 07/07/2023 (Exact Date)   SpO2 99%   BMI 38.25 kg/m    Body mass index is 38.25 kg/m .  Physical Exam     GENERAL: Healthy, alert and no distress  EYES: Eyes grossly normal to inspection.  No discharge or erythema, or obvious scleral/conjunctival abnormalities.  RESP: No audible wheeze, cough, or visible cyanosis.  No visible retractions or increased work of breathing.    SKIN: Nodule on left buttock is 1 cm across, non-erythematous, non-fluctuant. No discharge noted.   NEURO: Cranial nerves grossly intact.  Mentation and speech appropriate for age.  PSYCH: Mentation appears normal, affect normal/bright, judgement and insight intact, normal speech and appearance well-groomed.

## 2023-10-03 ENCOUNTER — PATIENT OUTREACH (OUTPATIENT)
Dept: CARE COORDINATION | Facility: CLINIC | Age: 33
End: 2023-10-03
Payer: COMMERCIAL

## 2023-10-03 NOTE — PROGRESS NOTES
Clinic Care Coordination Contact  Follow Up Progress Note      Assessment: The pt was recently in the ED, I called to check up on the pt and help the pt setup a ED follow up. I called the pt, someone answered, stated that there is no one by the pt name. They stated that its the wrong number.     Care Gaps:    Health Maintenance Due   Topic Date Due    ADVANCE CARE PLANNING  Never done    COVID-19 Vaccine (1) Never done    HEPATITIS B IMMUNIZATION (2 of 3 - 3-dose series) 07/02/2004    YEARLY PREVENTIVE VISIT  08/23/2023    INFLUENZA VACCINE (1) 09/01/2023    PAP  03/31/2024           Care Plans      Intervention/Education provided during outreach:               Plan:     Care Coordinator will follow up in

## 2023-10-08 ENCOUNTER — HEALTH MAINTENANCE LETTER (OUTPATIENT)
Age: 33
End: 2023-10-08

## 2024-01-03 ENCOUNTER — PATIENT OUTREACH (OUTPATIENT)
Dept: CARE COORDINATION | Facility: CLINIC | Age: 34
End: 2024-01-03
Payer: COMMERCIAL

## 2024-01-03 NOTE — PROGRESS NOTES
Clinic Care Coordination Contact  Follow Up Progress Note      Assessment: The pt was recently in the ED, I called to check up on the pt, and help the pt setup a ED follow up. I called the pt, but got her vm, so I left a vm, for the pt to give me a call back.       Care Gaps:    Health Maintenance Due   Topic Date Due    ADVANCE CARE PLANNING  Never done    COVID-19 Vaccine (1) Never done    HEPATITIS B IMMUNIZATION (2 of 3 - 3-dose series) 07/02/2004    YEARLY PREVENTIVE VISIT  08/23/2023    INFLUENZA VACCINE (1) 09/01/2023    PHQ-2 (once per calendar year)  01/01/2024    PAP  03/31/2024           Care Plans      Intervention/Education provided during outreach:               Plan:     Care Coordinator will follow up in

## 2024-01-04 ENCOUNTER — PATIENT OUTREACH (OUTPATIENT)
Dept: CARE COORDINATION | Facility: CLINIC | Age: 34
End: 2024-01-04
Payer: COMMERCIAL

## 2024-01-05 ENCOUNTER — PATIENT OUTREACH (OUTPATIENT)
Dept: CARE COORDINATION | Facility: CLINIC | Age: 34
End: 2024-01-05
Payer: COMMERCIAL

## 2024-01-05 NOTE — PROGRESS NOTES
Clinic Care Coordination Contact  Follow Up Progress Note      Assessment: The pt was recently in the ED, I called to check up on the pt, and help the pt setup a ED follow up. I called and talked to the pt, pt stated that she is doing better. She wanted to wait, if she feels she needs a follow up, she will call the clinic.     Care Gaps:    Health Maintenance Due   Topic Date Due    ADVANCE CARE PLANNING  Never done    COVID-19 Vaccine (1) Never done    HEPATITIS B IMMUNIZATION (2 of 3 - 3-dose series) 07/02/2004    YEARLY PREVENTIVE VISIT  08/23/2023    INFLUENZA VACCINE (1) 09/01/2023    PHQ-2 (once per calendar year)  01/01/2024    PAP  03/31/2024           Care Plans      Intervention/Education provided during outreach:               Plan:     Care Coordinator will follow up in

## 2024-03-01 ENCOUNTER — OFFICE VISIT (OUTPATIENT)
Dept: FAMILY MEDICINE | Facility: CLINIC | Age: 34
End: 2024-03-01
Payer: COMMERCIAL

## 2024-03-01 VITALS
TEMPERATURE: 98.1 F | BODY MASS INDEX: 40.18 KG/M2 | HEART RATE: 94 BPM | OXYGEN SATURATION: 97 % | DIASTOLIC BLOOD PRESSURE: 81 MMHG | WEIGHT: 250 LBS | HEIGHT: 66 IN | SYSTOLIC BLOOD PRESSURE: 138 MMHG

## 2024-03-01 DIAGNOSIS — N89.8 VAGINAL DISCHARGE: ICD-10-CM

## 2024-03-01 DIAGNOSIS — Z13.1 SCREENING FOR DIABETES MELLITUS: ICD-10-CM

## 2024-03-01 DIAGNOSIS — Z00.00 ROUTINE GENERAL MEDICAL EXAMINATION AT A HEALTH CARE FACILITY: Primary | ICD-10-CM

## 2024-03-01 DIAGNOSIS — Z11.3 SCREEN FOR STD (SEXUALLY TRANSMITTED DISEASE): ICD-10-CM

## 2024-03-01 LAB
CLUE CELLS: ABNORMAL
HBA1C MFR BLD: 5.6 % (ref 0–5.6)
TRICHOMONAS, WET PREP: ABNORMAL
WBC'S/HIGH POWER FIELD, WET PREP: ABNORMAL
YEAST, WET PREP: ABNORMAL

## 2024-03-01 PROCEDURE — 87389 HIV-1 AG W/HIV-1&-2 AB AG IA: CPT

## 2024-03-01 PROCEDURE — 86780 TREPONEMA PALLIDUM: CPT

## 2024-03-01 PROCEDURE — 87491 CHLMYD TRACH DNA AMP PROBE: CPT

## 2024-03-01 PROCEDURE — 87591 N.GONORRHOEAE DNA AMP PROB: CPT

## 2024-03-01 PROCEDURE — 83036 HEMOGLOBIN GLYCOSYLATED A1C: CPT

## 2024-03-01 PROCEDURE — 87210 SMEAR WET MOUNT SALINE/INK: CPT

## 2024-03-01 PROCEDURE — 36415 COLL VENOUS BLD VENIPUNCTURE: CPT

## 2024-03-01 PROCEDURE — 99395 PREV VISIT EST AGE 18-39: CPT | Mod: GC

## 2024-03-01 PROCEDURE — 99213 OFFICE O/P EST LOW 20 MIN: CPT | Mod: 25

## 2024-03-01 SDOH — HEALTH STABILITY: PHYSICAL HEALTH: ON AVERAGE, HOW MANY DAYS PER WEEK DO YOU ENGAGE IN MODERATE TO STRENUOUS EXERCISE (LIKE A BRISK WALK)?: 1 DAY

## 2024-03-01 ASSESSMENT — SOCIAL DETERMINANTS OF HEALTH (SDOH): HOW OFTEN DO YOU GET TOGETHER WITH FRIENDS OR RELATIVES?: TWICE A WEEK

## 2024-03-01 NOTE — PROGRESS NOTES
Preceptor Attestation:  Patient's case reviewed and discussed with the resident, Arben Short MD, and I personally evaluated the patient. I agree with written assessment and plan of care.    Supervising Physician:  Ramona Varghese MD   [facility]

## 2024-03-01 NOTE — PATIENT INSTRUCTIONS
Preventive Care Advice   This is general advice given by our system to help you stay healthy. However, your care team may have specific advice just for you. Please talk to your care team about your preventive care needs.  Nutrition  Eat 5 or more servings of fruits and vegetables each day.  Try wheat bread, brown rice and whole grain pasta (instead of white bread, rice, and pasta).  Get enough calcium and vitamin D. Check the label on foods and aim for 100% of the RDA (recommended daily allowance).  Lifestyle  Exercise at least 150 minutes each week   (30 minutes a day, 5 days a week).  Do muscle strengthening activities 2 days a week. These help control your weight and prevent disease.  No smoking.  Wear sunscreen to prevent skin cancer.  Have a dental exam and cleaning every 6 months.  Yearly exams  See your health care team every year to talk about:  Any changes in your health.  Any medicines your care team has prescribed.  Preventive care, family planning, and ways to prevent chronic diseases.  Shots (vaccines)   HPV shots (up to age 26), if you've never had them before.  Hepatitis B shots (up to age 59), if you've never had them before.  COVID-19 shot: Get this shot when it's due.  Flu shot: Get a flu shot every year.  Tetanus shot: Get a tetanus shot every 10 years.  Pneumococcal, hepatitis A, and RSV shots: Ask your care team if you need these based on your risk.  Shingles shot (for age 50 and up).  General health tests  Diabetes screening:  Starting at age 35, Get screened for diabetes at least every 3 years.  If you are younger than age 35, ask your care team if you should be screened for diabetes.  Cholesterol test: At age 39, start having a cholesterol test every 5 years, or more often if advised.  Bone density scan (DEXA): At age 50, ask your care team if you should have this scan for osteoporosis (brittle bones).  Hepatitis C: Get tested at least once in your life.  STIs (sexually transmitted  infections)  Before age 24: Ask your care team if you should be screened for STIs.  After age 24: Get screened for STIs if you're at risk. You are at risk for STIs (including HIV) if:  You are sexually active with more than one person.  You don't use condoms every time.  You or a partner was diagnosed with a sexually transmitted infection.  If you are at risk for HIV, ask about PrEP medicine to prevent HIV.  Get tested for HIV at least once in your life, whether you are at risk for HIV or not.  Cancer screening tests  Cervical cancer screening: If you have a cervix, begin getting regular cervical cancer screening tests at age 21. Most people who have regular screenings with normal results can stop after age 65. Talk about this with your provider.  Breast cancer scan (mammogram): If you've ever had breasts, begin having regular mammograms starting at age 40. This is a scan to check for breast cancer.  Colon cancer screening: It is important to start screening for colon cancer at age 45.  Have a colonoscopy test every 10 years (or more often if you're at risk) Or, ask your provider about stool tests like a FIT test every year or Cologuard test every 3 years.  To learn more about your testing options, visit: https://www.[employer]/168484.pdf.  For help making a decision, visit: https://bit.ly/vy77535.  Prostate cancer screening test: If you have a prostate and are age 55 to 69, ask your provider if you would benefit from a yearly prostate cancer screening test.  Lung cancer screening: If you are a current or former smoker age 50 to 80, ask your care team if ongoing lung cancer screenings are right for you.  For informational purposes only. Not to replace the advice of your health care provider. Copyright   2023 [employer]. All rights reserved. Clinically reviewed by the [facility]. [employer] 375555 - REV 01/24.

## 2024-03-02 LAB
C TRACH DNA SPEC QL PROBE+SIG AMP: NEGATIVE
N GONORRHOEA DNA SPEC QL NAA+PROBE: NEGATIVE
T PALLIDUM AB SER QL: NONREACTIVE

## 2024-03-03 LAB — HIV 1+2 AB+HIV1 P24 AG SERPL QL IA: NONREACTIVE

## 2024-03-04 ENCOUNTER — MYC MEDICAL ADVICE (OUTPATIENT)
Dept: FAMILY MEDICINE | Facility: CLINIC | Age: 34
End: 2024-03-04
Payer: COMMERCIAL

## 2024-03-04 DIAGNOSIS — B96.89 BV (BACTERIAL VAGINOSIS): Primary | ICD-10-CM

## 2024-03-04 DIAGNOSIS — N76.0 BV (BACTERIAL VAGINOSIS): Primary | ICD-10-CM

## 2024-03-05 RX ORDER — METRONIDAZOLE 500 MG/1
500 TABLET ORAL 2 TIMES DAILY
Qty: 14 TABLET | Refills: 0 | Status: SHIPPED | OUTPATIENT
Start: 2024-03-05 | End: 2024-03-12

## 2024-06-25 ENCOUNTER — OFFICE VISIT (OUTPATIENT)
Dept: FAMILY MEDICINE | Facility: CLINIC | Age: 34
End: 2024-06-25
Payer: COMMERCIAL

## 2024-06-25 VITALS
DIASTOLIC BLOOD PRESSURE: 89 MMHG | WEIGHT: 253 LBS | OXYGEN SATURATION: 99 % | TEMPERATURE: 98.6 F | RESPIRATION RATE: 20 BRPM | BODY MASS INDEX: 41.15 KG/M2 | HEART RATE: 74 BPM | SYSTOLIC BLOOD PRESSURE: 152 MMHG

## 2024-06-25 DIAGNOSIS — N89.8 VAGINAL DISCHARGE: ICD-10-CM

## 2024-06-25 DIAGNOSIS — R03.0 ELEVATED BLOOD PRESSURE READING WITHOUT DIAGNOSIS OF HYPERTENSION: ICD-10-CM

## 2024-06-25 DIAGNOSIS — F41.1 GAD (GENERALIZED ANXIETY DISORDER): Primary | ICD-10-CM

## 2024-06-25 DIAGNOSIS — Z11.3 ROUTINE SCREENING FOR STI (SEXUALLY TRANSMITTED INFECTION): ICD-10-CM

## 2024-06-25 PROCEDURE — 87210 SMEAR WET MOUNT SALINE/INK: CPT

## 2024-06-25 PROCEDURE — 99214 OFFICE O/P EST MOD 30 MIN: CPT | Mod: GC

## 2024-06-25 PROCEDURE — 87591 N.GONORRHOEAE DNA AMP PROB: CPT

## 2024-06-25 PROCEDURE — 87491 CHLMYD TRACH DNA AMP PROBE: CPT

## 2024-06-25 ASSESSMENT — ANXIETY QUESTIONNAIRES
5. BEING SO RESTLESS THAT IT IS HARD TO SIT STILL: SEVERAL DAYS
7. FEELING AFRAID AS IF SOMETHING AWFUL MIGHT HAPPEN: NOT AT ALL
6. BECOMING EASILY ANNOYED OR IRRITABLE: SEVERAL DAYS
GAD7 TOTAL SCORE: 5
1. FEELING NERVOUS, ANXIOUS, OR ON EDGE: SEVERAL DAYS
3. WORRYING TOO MUCH ABOUT DIFFERENT THINGS: SEVERAL DAYS
2. NOT BEING ABLE TO STOP OR CONTROL WORRYING: SEVERAL DAYS
GAD7 TOTAL SCORE: 5

## 2024-06-25 ASSESSMENT — PATIENT HEALTH QUESTIONNAIRE - PHQ9: 5. POOR APPETITE OR OVEREATING: NOT AT ALL
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
## 2024-06-26 DIAGNOSIS — N76.0 BV (BACTERIAL VAGINOSIS): Primary | ICD-10-CM

## 2024-06-26 DIAGNOSIS — B96.89 BV (BACTERIAL VAGINOSIS): Primary | ICD-10-CM

## 2024-06-26 LAB
C TRACH DNA SPEC QL PROBE+SIG AMP: NEGATIVE
N GONORRHOEA DNA SPEC QL NAA+PROBE: NEGATIVE

## 2024-06-26 RX ORDER — METRONIDAZOLE 500 MG/1
500 TABLET ORAL 2 TIMES DAILY
Qty: 14 TABLET | Refills: 0 | Status: SHIPPED | OUTPATIENT
Start: 2024-06-26 | End: 2024-07-03

## 2024-07-01 ENCOUNTER — MYC MEDICAL ADVICE (OUTPATIENT)
Dept: CARE COORDINATION | Facility: CLINIC | Age: 34
End: 2024-07-01
Payer: COMMERCIAL

## 2024-10-03 ENCOUNTER — OFFICE VISIT (OUTPATIENT)
Dept: FAMILY MEDICINE | Facility: CLINIC | Age: 34
End: 2024-10-03
Payer: COMMERCIAL

## 2024-10-03 VITALS
BODY MASS INDEX: 39.99 KG/M2 | WEIGHT: 240 LBS | TEMPERATURE: 97.4 F | RESPIRATION RATE: 22 BRPM | DIASTOLIC BLOOD PRESSURE: 83 MMHG | SYSTOLIC BLOOD PRESSURE: 131 MMHG | OXYGEN SATURATION: 98 % | HEIGHT: 65 IN | HEART RATE: 72 BPM

## 2024-10-03 DIAGNOSIS — N76.0 BACTERIAL VAGINOSIS: Primary | ICD-10-CM

## 2024-10-03 DIAGNOSIS — B96.89 BACTERIAL VAGINOSIS: Primary | ICD-10-CM

## 2024-10-03 PROCEDURE — 87210 SMEAR WET MOUNT SALINE/INK: CPT | Performed by: FAMILY MEDICINE

## 2024-10-03 PROCEDURE — 99213 OFFICE O/P EST LOW 20 MIN: CPT | Performed by: FAMILY MEDICINE

## 2024-10-03 RX ORDER — NAPROXEN 500 MG/1
500 TABLET ORAL 2 TIMES DAILY WITH MEALS
COMMUNITY
Start: 2024-01-02

## 2024-10-03 NOTE — PROGRESS NOTES
"  Assessment & Plan     (N76.0,  B96.89) Bacterial vaginosis  (primary encounter diagnosis)  Comment: will notify on mychart  Plan: Wet prep        Counseled on option of longer course if frustrated by ongoing discharge and more of a chronic recurrent BV.          BMI  Estimated body mass index is 39.94 kg/m  as calculated from the following:    Height as of this encounter: 1.651 m (5' 5\").    Weight as of this encounter: 108.9 kg (240 lb).             No follow-ups on file.    Subjective   Ochoa is a 33 year old, presenting for the following health issues:  STD (Check up)    HPI         BV  Clear discharge and small odor.  \"It's embarrassing\"  Same partner, no concerns about STI, gets these 3 x/year and frustrated by recurrent   No new symptoms or concerns  Feels like takes metronidazole orally and then it seems to just go away for several months    Has tried and most improved with metronidazole orally, aware of possible pro-biotic or yogurt as an option, has tried apple cider vinegar bath    Declines health maintenance: vaccines, pap              Objective    /83   Pulse 72   Temp 97.4  F (36.3  C) (Oral)   Resp 22   Ht 1.651 m (5' 5\")   Wt 108.9 kg (240 lb)   SpO2 98%   BMI 39.94 kg/m    Body mass index is 39.94 kg/m .  Physical Exam   GENERAL: alert and no distress  ABDOMEN: soft, nontender, no hepatosplenomegaly, no masses and bowel sounds normal  PSYCH: mentation appears normal, affect normal/bright    Results for orders placed or performed in visit on 10/03/24 (from the past 24 hour(s))   Wet prep    Specimen: Vagina; Swab   Result Value Ref Range    Trichomonas Absent Absent    Yeast Absent Absent    Clue Cells Absent Absent    WBCs/high power field 1+ (A) None    Narrative    No odor  No ph  Moderate bacteria           Signed Electronically by: Corinne Baum MD    "

## 2024-12-09 ENCOUNTER — OFFICE VISIT (OUTPATIENT)
Dept: FAMILY MEDICINE | Facility: CLINIC | Age: 34
End: 2024-12-09
Payer: COMMERCIAL

## 2024-12-09 VITALS
DIASTOLIC BLOOD PRESSURE: 89 MMHG | TEMPERATURE: 98.2 F | HEART RATE: 56 BPM | SYSTOLIC BLOOD PRESSURE: 156 MMHG | RESPIRATION RATE: 19 BRPM | WEIGHT: 235 LBS | OXYGEN SATURATION: 97 % | BODY MASS INDEX: 39.15 KG/M2 | HEIGHT: 65 IN

## 2024-12-09 DIAGNOSIS — M77.11 LATERAL EPICONDYLITIS OF RIGHT ELBOW: Primary | ICD-10-CM

## 2024-12-09 DIAGNOSIS — R03.0 ELEVATED BLOOD PRESSURE READING WITHOUT DIAGNOSIS OF HYPERTENSION: ICD-10-CM

## 2024-12-09 DIAGNOSIS — B96.89 BV (BACTERIAL VAGINOSIS): ICD-10-CM

## 2024-12-09 DIAGNOSIS — N76.0 BV (BACTERIAL VAGINOSIS): ICD-10-CM

## 2024-12-09 LAB
BACTERIAL VAGINOSIS VAG-IMP: POSITIVE
CANDIDA DNA VAG QL NAA+PROBE: NOT DETECTED
CANDIDA GLABRATA / CANDIDA KRUSEI DNA: NOT DETECTED
T VAGINALIS DNA VAG QL NAA+PROBE: NOT DETECTED

## 2024-12-09 RX ORDER — METRONIDAZOLE 7.5 MG/G
1 GEL VAGINAL
Qty: 70 G | Refills: 5 | Status: SHIPPED | OUTPATIENT
Start: 2024-12-16

## 2024-12-09 RX ORDER — METRONIDAZOLE 500 MG/1
500 TABLET ORAL 2 TIMES DAILY
Qty: 14 TABLET | Refills: 0 | Status: SHIPPED | OUTPATIENT
Start: 2024-12-09 | End: 2024-12-16

## 2024-12-09 NOTE — PROGRESS NOTES
"  Assessment & Plan     Lateral epicondylitis of right elbow  Will change to naproxen for pain mangement. Gave exercises, keep icing, keep elbow straight when able. Try some compression on the muscle group, and ace wrap at night to help with pain/keep elbow straight. Follow-up if not improving over next month    BV (bacterial vaginosis)  Repeat labs-PCR to rule out yeast, trich contributing, anticipate positive BV, prefers oral course will give to have on hand. Given recurrence will have her do 6 months suppression with vaginal metronidazole.  - Multiplex Vaginal Panel by PCR; Future    BP: Declines home cuff today, will come for pap and recheck in a month and plan to start home bp monitoring then if needed.      BMI  Estimated body mass index is 39.11 kg/m  as calculated from the following:    Height as of this encounter: 1.651 m (5' 5\").    Weight as of this encounter: 106.6 kg (235 lb).             No follow-ups on file.    Guerrero Packer is a 34 year old, presenting for the following health issues:  Arm Pain (X 2 months, sx stays the same)      12/9/2024     9:13 AM   Additional Questions   Roomed by Ria   Accompanied by RITA         12/9/2024    Information    services provided? 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 Nothing happened to provoke it. Came on all of a sudden. The first week or so just very painful, tried icing. Ibuprofen for pain, doesn't help much. Worse with sleeping, doing hair, picking something up. Has to carry heavy groceries on the left though right is dominant.     BV: Has had recurrently. Tried boric acid because she hates coming in for rx.     Elevated BP: High several times in clinic, has not tried taken it at home. Thinks from pain this time. Discussed home BP cuff.                  Objective    BP (!) 156/89   Pulse 56   Temp 98.2  F (36.8 " " C)   Resp 19   Ht 1.651 m (5' 5\")   Wt 106.6 kg (235 lb)   LMP 11/20/2024 (Approximate)   SpO2 97%   BMI 39.11 kg/m    Body mass index is 39.11 kg/m .  Physical Exam     Normal  strength right hand. Tender over right lateral epicondyle, extensor tendon and muscles. Negative phalen/tinels, non-tender medial epicondyle.             Signed Electronically by: Mena Garland MD    "

## 2025-01-02 ENCOUNTER — OFFICE VISIT (OUTPATIENT)
Dept: FAMILY MEDICINE | Facility: CLINIC | Age: 35
End: 2025-01-02
Payer: COMMERCIAL

## 2025-01-02 VITALS
OXYGEN SATURATION: 99 % | WEIGHT: 236 LBS | BODY MASS INDEX: 37.93 KG/M2 | SYSTOLIC BLOOD PRESSURE: 139 MMHG | HEIGHT: 66 IN | TEMPERATURE: 98.4 F | HEART RATE: 61 BPM | DIASTOLIC BLOOD PRESSURE: 97 MMHG | RESPIRATION RATE: 16 BRPM

## 2025-01-02 DIAGNOSIS — H66.90 ACUTE OTITIS MEDIA, UNSPECIFIED OTITIS MEDIA TYPE: Primary | ICD-10-CM

## 2025-01-02 RX ORDER — FLUTICASONE PROPIONATE 50 MCG
1 SPRAY, SUSPENSION (ML) NASAL DAILY
Qty: 11.1 ML | Refills: 0 | Status: SHIPPED | OUTPATIENT
Start: 2025-01-02

## 2025-01-02 NOTE — PROGRESS NOTES
Faculty Supervision of Residents   I have examined this patient and the medical care has been evaluated and discussed with the resident. See resident note outlining our discussion.    Saritha Dale MD

## 2025-01-02 NOTE — PROGRESS NOTES
Assessment & Plan     Acute otitis media, unspecified otitis media type  Exam consistent with acute otitis media, treatment plan outlined below. Ears required irrigation today prior to ear exam due to full circumferential wax blocking the ear canal, which was also likely contributing to pain, hearing loss, and erythema. States she is able to hear much better out of the right ear after irrigation. Encouraged to stop using Q-tips to clean the ears. Encouraged to follow-up in 7 days if no improvement with antibiotics and Flonase.   - amoxicillin-clavulanate (AUGMENTIN) 875-125 MG tablet; Take 1 tablet by mouth 2 times daily for 7 days.  - fluticasone (FLONASE) 50 MCG/ACT nasal spray; Spray 1 spray into both nostrils daily.      Subjective   Ochoa is a 34 year old, presenting for the following health issues:  Ear Problem (Right ear pain, swollen lymph nodes)  First noted some hearing loss and closing of the right ear that has progressively worsened. On Saturday, noticed swollen lymph nodes behind the ear that are now tender. Noticing some right ear pain as well. Has not noticed any drainage out of the ear. Denies any sick contacts. Has tried ibuprofen which is minimally helpful.         Objective    LMP 11/20/2024 (Approximate)   There is no height or weight on file to calculate BMI.  Physical Exam   R Ear: Erythematous and bulging TM with fluid noted behind the TM. No effusion noted. External canal slightly erythematous after water irrigation.   LYMPH: subauricular lymph nodes palpable and tender on the right.           Signed Electronically by: Gregory Duran MD

## 2025-01-14 ENCOUNTER — OFFICE VISIT (OUTPATIENT)
Dept: FAMILY MEDICINE | Facility: CLINIC | Age: 35
End: 2025-01-14
Payer: COMMERCIAL

## 2025-01-14 VITALS
DIASTOLIC BLOOD PRESSURE: 91 MMHG | RESPIRATION RATE: 16 BRPM | SYSTOLIC BLOOD PRESSURE: 128 MMHG | BODY MASS INDEX: 37.12 KG/M2 | HEART RATE: 78 BPM | TEMPERATURE: 99.1 F | HEIGHT: 66 IN | WEIGHT: 231 LBS | OXYGEN SATURATION: 98 %

## 2025-01-14 DIAGNOSIS — L50.9 URTICARIAL RASH: Primary | ICD-10-CM

## 2025-01-14 DIAGNOSIS — Z88.1 ALLERGIC REACTION TO AMOXICILLIN/CLAVULANIC ACID: ICD-10-CM

## 2025-01-14 RX ORDER — CETIRIZINE HYDROCHLORIDE 10 MG/1
10 TABLET ORAL DAILY PRN
Qty: 30 TABLET | Refills: 0 | Status: SHIPPED | OUTPATIENT
Start: 2025-01-14 | End: 2025-01-14

## 2025-01-14 RX ORDER — CETIRIZINE HYDROCHLORIDE 10 MG/1
10 TABLET ORAL DAILY PRN
Qty: 30 TABLET | Refills: 0 | Status: SHIPPED | OUTPATIENT
Start: 2025-01-14

## 2025-01-14 NOTE — PROGRESS NOTES
"  Assessment & Plan     Urticarial rash  Allergic reaction to amoxicillin/clavulanic acid  Patient presents with new onset urticarial rash and periorbital angioedema 5 days after completing course of Augmentin for ear infection.  No other obvious causes of rash identified.  Not in acute distress.  Will trial anti-histamine. Sent referral to allergist to confirm the allergy.  ED and return precautions given and printed in AVS.  Augmentin put on her allergy list.  - cetirizine (ZYRTEC) 10 MG tablet  Dispense: 30 tablet; Refill: 0        Return if symptoms worsen or fail to improve.    Subjective   Ochoa is a 34 year old, presenting for the following health issues:  Swelling  (Eyes and ears ), Derm Problem (Rash (red dots) All over the body and really itching. ), and Imm/Inj (Declined all the shots. 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 No           Objective    BP (!) 128/91   Pulse 78   Temp 99.1  F (37.3  C) (Tympanic)   Resp 16   Ht 1.68 m (5' 6.14\")   Wt 104.8 kg (231 lb)   LMP 01/10/2025 (Exact Date)   SpO2 98%   BMI 37.12 kg/m    Body mass index is 37.12 kg/m .  Physical Exam     GENERAL: alert and no distress  EYES: there is periorbital swelling, Eyes otherwise, grossly normal to inspection, conjunctivae and sclerae normal  HENT: ear canals and TM's normal, nose and mouth without ulcers or lesions  NECK: no adenopathy, no " asymmetry, masses, or scars  RESP: lungs clear to auscultation - no rales, rhonchi or wheezes  CV: regular rate and rhythm  ABDOMEN: soft, nontender  MS: no gross musculoskeletal defects noted  SKIN: pruritic, circumscribed, raised, and erythematous eruptions present on upper and lower extremities, torso and face.    NEURO: Normal strength and tone, mentation intact and speech normal  PSYCH: mentation appears normal, affect normal/bright            Signed Electronically by: Omer Orozco MD

## 2025-01-14 NOTE — NURSING NOTE
Patient systolic BP was high today and when I ask if I can make a follow up BP appt for her, she declined at this because she doesn't know her work schedule yet. She will call back later.

## 2025-01-30 ENCOUNTER — PATIENT OUTREACH (OUTPATIENT)
Dept: CARE COORDINATION | Facility: CLINIC | Age: 35
End: 2025-01-30
Payer: COMMERCIAL

## 2025-04-05 ENCOUNTER — HEALTH MAINTENANCE LETTER (OUTPATIENT)
Age: 35
End: 2025-04-05

## 2025-06-18 ENCOUNTER — OFFICE VISIT (OUTPATIENT)
Dept: FAMILY MEDICINE | Facility: CLINIC | Age: 35
End: 2025-06-18
Payer: COMMERCIAL

## 2025-06-18 VITALS
DIASTOLIC BLOOD PRESSURE: 85 MMHG | OXYGEN SATURATION: 97 % | SYSTOLIC BLOOD PRESSURE: 127 MMHG | WEIGHT: 237 LBS | BODY MASS INDEX: 38.09 KG/M2 | HEIGHT: 66 IN | RESPIRATION RATE: 22 BRPM | HEART RATE: 80 BPM | TEMPERATURE: 98.2 F

## 2025-06-18 DIAGNOSIS — Z11.3 ROUTINE SCREENING FOR STI (SEXUALLY TRANSMITTED INFECTION): ICD-10-CM

## 2025-06-18 DIAGNOSIS — Z00.00 ROUTINE GENERAL MEDICAL EXAMINATION AT A HEALTH CARE FACILITY: Primary | ICD-10-CM

## 2025-06-18 DIAGNOSIS — R03.0 ELEVATED BLOOD PRESSURE READING WITHOUT DIAGNOSIS OF HYPERTENSION: ICD-10-CM

## 2025-06-18 DIAGNOSIS — Z12.4 CERVICAL CANCER SCREENING: ICD-10-CM

## 2025-06-18 SDOH — HEALTH STABILITY: PHYSICAL HEALTH: ON AVERAGE, HOW MANY MINUTES DO YOU ENGAGE IN EXERCISE AT THIS LEVEL?: 60 MIN

## 2025-06-18 SDOH — HEALTH STABILITY: PHYSICAL HEALTH: ON AVERAGE, HOW MANY DAYS PER WEEK DO YOU ENGAGE IN MODERATE TO STRENUOUS EXERCISE (LIKE A BRISK WALK)?: 1 DAY

## 2025-06-18 ASSESSMENT — SOCIAL DETERMINANTS OF HEALTH (SDOH): HOW OFTEN DO YOU GET TOGETHER WITH FRIENDS OR RELATIVES?: TWICE A WEEK

## 2025-06-18 NOTE — PATIENT INSTRUCTIONS
Patient Education   Preventive Care Advice   This is general advice given by our system to help you stay healthy. However, your care team may have specific advice just for you. Please talk to your care team about your preventive care needs.  Nutrition  Eat 5 or more servings of fruits and vegetables each day.  Try wheat bread, brown rice and whole grain pasta (instead of white bread, rice, and pasta).  Get enough calcium and vitamin D. Check the label on foods and aim for 100% of the RDA (recommended daily allowance).  Lifestyle  Exercise at least 150 minutes each week  (30 minutes a day, 5 days a week).  Do muscle strengthening activities 2 days a week. These help control your weight and prevent disease.  No smoking.  Wear sunscreen to prevent skin cancer.  Have a dental exam and cleaning every 6 months.  Yearly exams  See your health care team every year to talk about:  Any changes in your health.  Any medicines your care team has prescribed.  Preventive care, family planning, and ways to prevent chronic diseases.  Shots (vaccines)   HPV shots (up to age 26), if you've never had them before.  Hepatitis B shots (up to age 59), if you've never had them before.  COVID-19 shot: Get this shot when it's due.  Flu shot: Get a flu shot every year.  Tetanus shot: Get a tetanus shot every 10 years.  Pneumococcal, hepatitis A, and RSV shots: Ask your care team if you need these based on your risk.  Shingles shot (for age 50 and up)  General health tests  Diabetes screening:  Starting at age 35, Get screened for diabetes at least every 3 years.  If you are younger than age 35, ask your care team if you should be screened for diabetes.  Cholesterol test: At age 39, start having a cholesterol test every 5 years, or more often if advised.  Bone density scan (DEXA): At age 50, ask your care team if you should have this scan for osteoporosis (brittle bones).  Hepatitis C: Get tested at least once in your life.  STIs (sexually  transmitted infections)  Before age 24: Ask your care team if you should be screened for STIs.  After age 24: Get screened for STIs if you're at risk. You are at risk for STIs (including HIV) if:  You are sexually active with more than one person.  You don't use condoms every time.  You or a partner was diagnosed with a sexually transmitted infection.  If you are at risk for HIV, ask about PrEP medicine to prevent HIV.  Get tested for HIV at least once in your life, whether you are at risk for HIV or not.  Cancer screening tests  Cervical cancer screening: If you have a cervix, begin getting regular cervical cancer screening tests starting at age 21.  Breast cancer scan (mammogram): If you've ever had breasts, begin having regular mammograms starting at age 40. This is a scan to check for breast cancer.  Colon cancer screening: It is important to start screening for colon cancer at age 45.  Have a colonoscopy test every 10 years (or more often if you're at risk) Or, ask your provider about stool tests like a FIT test every year or Cologuard test every 3 years.  To learn more about your testing options, visit:   .  For help making a decision, visit:   https://bit.ly/rc74765.  Prostate cancer screening test: If you have a prostate, ask your care team if a prostate cancer screening test (PSA) at age 55 is right for you.  Lung cancer screening: If you are a current or former smoker ages 50 to 80, ask your care team if ongoing lung cancer screenings are right for you.  For informational purposes only. Not to replace the advice of your health care provider. Copyright   2023 [employer]. All rights reserved. Clinically reviewed by the [facility] Transitions Program. Mandae [id number] - REV 01/24.

## 2025-06-18 NOTE — PROGRESS NOTES
"Preventive Care Visit  [facility]  Samia Hoffman MD, Family Medicine  Jun 18, 2025      Assessment & Plan     (Z00.00) Routine general medical examination at a health care facility  (primary encounter diagnosis)  Comment: Feels overall well. Has concerns about elevated BP as detailed below.   Plan: cervical cancer screening           STI screening as below    (Z12.4) Cervical cancer screening  Plan: HPV and Gynecologic Cytology Panel -         Recommended Age 30 - 65 Years            (R03.0) Elevated blood pressure reading without diagnosis of hypertension  Comment: initial reading 139/85 repeat 17/85, over the years had visits with elevated BP could be related to white coat HTN and hx of anxiety. Discussed checking BP at home for the next two weeks. Will check BMP and TSH today. Has Fhx of HTN -mother. Low salt, increase physical activity.   Plan: TSH with free T4 reflex, Basic metabolic panel           BMP           Follow up within two weeks --bring BP readings    (Z11.3) Routine screening for STI (sexually transmitted infection)  Comment: reports no recent exposure. Not sexually active currently, not on birth control. Request screening. LMP: last month.  Plan: HIV Antigen Antibody Combo Reading, Wet         preparation, Chlamydia trachomatis/Neisseria         gonorrhoeae by PCR               BMI  Estimated body mass index is 38.25 kg/m  as calculated from the following:    Height as of this encounter: 1.676 m (5' 6\").    Weight as of this encounter: 107.5 kg (237 lb).   Weight management plan: Discussed healthy diet and exercise guidelines  Reviewed preventive health counseling, as reflected in patient instructions  Counseling  Appropriate preventive services were addressed with this patient via screening, questionnaire, or discussion as appropriate for fall prevention, nutrition, physical activity, Tobacco-use cessation, social engagement, weight loss and cognition.  Checklist " reviewing preventive services available has been given to the patient.  Reviewed patient's diet, addressing concerns and/or questions.   She is at risk for lack of exercise and has been provided with information to increase physical activity for the benefit of her well-being.         Guerrero Packer is a 34 year old, presenting for the following:  Physical (Been having some chest disc for 1-2 months, sx not worst)        6/18/2025    10:35 AM   Additional Questions   Roomed by Ria   Accompanied by RITA         6/18/2025    Information    services provided? No          HPI  Preventive visits. Feeling overall well but has concerns about high blood pressure. No headaches. Feels pulsation in chest, throat or hands at night. No chest pain. No hx gestational HTN  Fhx of HTN--mother  Hx of anxiety prescribed Zoloft but not taking  Feels mood is stable   Drinks energy drinks about twice weekly   Didn't drink energy drink today  Not a smoker   Drinks wine 1-2x/week   Wants STI screening GC/Chlamydia, wet prep  Not sexually active but wants full screening   Pap smear today         Discussed advance care planning with patient; informed AVS has link to [facility].        6/18/2025   General Health   How would you rate your overall physical health? (!) FAIR   Feel stress (tense, anxious, or unable to sleep) Only a little   (!) STRESS CONCERN      6/18/2025   Nutrition   Three or more servings of calcium each day? Yes   Diet: Regular (no restrictions)   How many servings of fruit and vegetables per day? (!) 0-1   How many sweetened beverages each day? (!) 3         6/18/2025   Exercise   Days per week of moderate/strenous exercise 1 day   Average minutes spent exercising at this level 60 min   (!) EXERCISE CONCERN      6/18/2025   Social Factors   Frequency of gathering with friends or relatives Twice a week   Worry food won't last until get money to buy more No   Food not last or not have enough  money for food? No   Do you have housing? (Housing is defined as stable permanent housing and does not include staying outside in a car, in a tent, in an abandoned building, in an overnight shelter, or couch-surfing.) Yes   Are you worried about losing your housing? No   Lack of transportation? No   Unable to get utilities (heat,electricity)? No         2025   Dental   Dentist two times every year? (!) DECLINE         Today's PHQ-2 Score:       2025     7:58 AM   PHQ-2 (  [employer])   Q1: Little interest or pleasure in doing things 0   Q2: Feeling down, depressed or hopeless 0   PHQ-2 Score 0    Q1: Little interest or pleasure in doing things Not at all   Q2: Feeling down, depressed or hopeless Not at all   PHQ-2 Score 0       Patient-reported           2025   Substance Use   Alcohol more than 3/day or more than 7/wk No   Do you use any other substances recreationally? No     Social History     Tobacco Use    Smoking status: Never     Passive exposure: Never    Smokeless tobacco: Never   Vaping Use    Vaping status: Never Used   Substance Use Topics    Alcohol use: Yes     Comment: Rare    Drug use: Never          Mammogram Screening - Patient under 40 years of age: Routine Mammogram Screening not recommended.         2025   STI Screening   New sexual partner(s) since last STI/HIV test? No     History of abnormal Pap smear: YES - reflected in Problem List and Health Maintenance accordingly             2025   Contraception/Family Planning   Questions about contraception or family planning No   What are your periods like? Regular        Reviewed and updated as needed this visit by Provider                    No past medical history on file.  Past Surgical History:   Procedure Laterality Date    C EACH ADD TOOTH EXTRACTION      NO HISTORY OF SURGERY       OB History    Para Term  AB Living   1 1 1 0 0 1   SAB IAB Ectopic Multiple Live Births   0 0 0 0 1      # Outcome Date GA Lbr  "Efrem/2nd Weight Sex Type Anes PTL Lv   1 Term 09/22/12    F Vag-Spont   ELIUD         Review of Systems  Constitutional, HEENT, cardiovascular, pulmonary, gi and gu systems are negative, except as otherwise noted.     Objective    Exam  /85   Pulse 80   Temp 98.2  F (36.8  C)   Resp 22   Ht 1.676 m (5' 6\")   Wt 107.5 kg (237 lb)   LMP 05/02/2025 (Approximate)   SpO2 97%   BMI 38.25 kg/m     Estimated body mass index is 38.25 kg/m  as calculated from the following:    Height as of this encounter: 1.676 m (5' 6\").    Weight as of this encounter: 107.5 kg (237 lb).    Physical Exam  GENERAL: alert and no distress  RESP: lungs clear to auscultation - no rales, rhonchi or wheezes  CV: regular rate and rhythm, normal S1 S2, no murmur, no peripheral edema  ABDOMEN: soft, nontender, no hepatosplenomegaly, no masses and bowel sounds normal   (female): normal female external genitalia, normal urethral meatus, normal vaginal mucosa, copious white discharge  MS: no gross musculoskeletal defects noted, no edema  SKIN: no suspicious lesions or rashes  NEURO: Normal strength and tone, mentation intact and speech normal  PSYCH: mentation appears normal, affect normal/bright        Signed Electronically by: MOE Pettit PGY3    "

## 2025-06-19 LAB
ANION GAP SERPL CALCULATED.3IONS-SCNC: 12 MMOL/L (ref 7–15)
BUN SERPL-MCNC: 7.3 MG/DL (ref 6–20)
C TRACH DNA SPEC QL PROBE+SIG AMP: NEGATIVE
CALCIUM SERPL-MCNC: 9.3 MG/DL (ref 8.8–10.4)
CHLORIDE SERPL-SCNC: 105 MMOL/L (ref 98–107)
CREAT SERPL-MCNC: 0.9 MG/DL (ref 0.51–0.95)
EGFRCR SERPLBLD CKD-EPI 2021: 86 ML/MIN/1.73M2
GLUCOSE SERPL-MCNC: 97 MG/DL (ref 70–99)
HCO3 SERPL-SCNC: 21 MMOL/L (ref 22–29)
HIV 1+2 AB+HIV1 P24 AG SERPL QL IA: NONREACTIVE
N GONORRHOEA DNA SPEC QL NAA+PROBE: NEGATIVE
POTASSIUM SERPL-SCNC: 4.1 MMOL/L (ref 3.4–5.3)
SODIUM SERPL-SCNC: 138 MMOL/L (ref 135–145)
SPECIMEN TYPE: NORMAL
TSH SERPL DL<=0.005 MIU/L-ACNC: 1.25 UIU/ML (ref 0.3–4.2)

## 2025-06-20 ENCOUNTER — RESULTS FOLLOW-UP (OUTPATIENT)
Dept: FAMILY MEDICINE | Facility: CLINIC | Age: 35
End: 2025-06-20

## 2025-06-23 LAB
BKR AP ASSOCIATED HPV REPORT: NORMAL
BKR LAB AP GYN ADEQUACY: NORMAL
BKR LAB AP GYN INTERPRETATION: NORMAL
BKR LAB AP PREVIOUS ABNORMAL: NORMAL
PATH REPORT.COMMENTS IMP SPEC: NORMAL
PATH REPORT.COMMENTS IMP SPEC: NORMAL
PATH REPORT.RELEVANT HX SPEC: NORMAL

## 2025-06-24 DIAGNOSIS — N76.0 BACTERIAL VAGINOSIS: Primary | ICD-10-CM

## 2025-06-24 DIAGNOSIS — B96.89 BACTERIAL VAGINOSIS: Primary | ICD-10-CM

## 2025-06-24 RX ORDER — METRONIDAZOLE 500 MG/1
500 TABLET ORAL 2 TIMES DAILY
Qty: 14 TABLET | Refills: 0 | Status: SHIPPED | OUTPATIENT
Start: 2025-06-24 | End: 2025-07-01